# Patient Record
Sex: MALE | Race: WHITE | NOT HISPANIC OR LATINO | Employment: OTHER | URBAN - METROPOLITAN AREA
[De-identification: names, ages, dates, MRNs, and addresses within clinical notes are randomized per-mention and may not be internally consistent; named-entity substitution may affect disease eponyms.]

---

## 2019-10-28 ENCOUNTER — TRANSCRIBE ORDERS (OUTPATIENT)
Dept: ADMINISTRATIVE | Facility: HOSPITAL | Age: 64
End: 2019-10-28

## 2019-10-28 DIAGNOSIS — R94.39 ABNORMAL STRESS TEST: Primary | ICD-10-CM

## 2019-10-29 ENCOUNTER — HOSPITAL ENCOUNTER (OUTPATIENT)
Facility: HOSPITAL | Age: 64
Setting detail: HOSPITAL OUTPATIENT SURGERY
Discharge: HOME | End: 2019-10-29
Attending: INTERNAL MEDICINE | Admitting: INTERNAL MEDICINE

## 2019-10-29 VITALS
SYSTOLIC BLOOD PRESSURE: 154 MMHG | OXYGEN SATURATION: 96 % | RESPIRATION RATE: 16 BRPM | BODY MASS INDEX: 26.74 KG/M2 | TEMPERATURE: 98.2 F | HEIGHT: 71 IN | HEART RATE: 81 BPM | WEIGHT: 191 LBS | DIASTOLIC BLOOD PRESSURE: 83 MMHG

## 2019-10-29 DIAGNOSIS — R94.39 ABNORMAL STRESS TEST: ICD-10-CM

## 2019-10-29 LAB
ANION GAP SERPL CALCULATED.3IONS-SCNC: 9 MMOL/L (ref 5–15)
BUN BLD-MCNC: 13 MG/DL (ref 8–23)
BUN BLDA-MCNC: 13 MG/DL (ref 8–26)
BUN/CREAT SERPL: 17.3 (ref 7–25)
CA-I BLDA-SCNC: 1.27 MMOL/L (ref 1.2–1.32)
CALCIUM SPEC-SCNC: 9.5 MG/DL (ref 8.6–10.5)
CHLORIDE BLDA-SCNC: 98 MMOL/L (ref 98–109)
CHLORIDE SERPL-SCNC: 100 MMOL/L (ref 98–107)
CO2 BLDA-SCNC: 32 MMOL/L (ref 24–29)
CO2 SERPL-SCNC: 31 MMOL/L (ref 22–29)
CREAT BLD-MCNC: 0.75 MG/DL (ref 0.76–1.27)
CREAT BLDA-MCNC: 1 MG/DL (ref 0.6–1.3)
DEPRECATED RDW RBC AUTO: 38.3 FL (ref 37–54)
ERYTHROCYTE [DISTWIDTH] IN BLOOD BY AUTOMATED COUNT: 12.8 % (ref 12.3–15.4)
GFR SERPL CREATININE-BSD FRML MDRD: 105 ML/MIN/1.73
GLUCOSE BLD-MCNC: 103 MG/DL (ref 65–99)
GLUCOSE BLDC GLUCOMTR-MCNC: 100 MG/DL (ref 70–130)
HCT VFR BLD AUTO: 50.8 % (ref 37.5–51)
HCT VFR BLDA CALC: 51 % (ref 38–51)
HGB BLD-MCNC: 17 G/DL (ref 13–17.7)
HGB BLDA-MCNC: 17.3 G/DL (ref 12–17)
MCH RBC QN AUTO: 27.8 PG (ref 26.6–33)
MCHC RBC AUTO-ENTMCNC: 33.5 G/DL (ref 31.5–35.7)
MCV RBC AUTO: 83 FL (ref 79–97)
PLATELET # BLD AUTO: 144 10*3/MM3 (ref 140–450)
PMV BLD AUTO: 10.6 FL (ref 6–12)
POTASSIUM BLD-SCNC: 4.9 MMOL/L (ref 3.5–5.2)
POTASSIUM BLDA-SCNC: 4.6 MMOL/L (ref 3.5–4.9)
RBC # BLD AUTO: 6.12 10*6/MM3 (ref 4.14–5.8)
SODIUM BLD-SCNC: 140 MMOL/L (ref 136–145)
SODIUM BLDA-SCNC: 140 MMOL/L (ref 138–146)
WBC NRBC COR # BLD: 5.78 10*3/MM3 (ref 3.4–10.8)

## 2019-10-29 PROCEDURE — 25010000002 MIDAZOLAM PER 1 MG: Performed by: INTERNAL MEDICINE

## 2019-10-29 PROCEDURE — C1769 GUIDE WIRE: HCPCS | Performed by: INTERNAL MEDICINE

## 2019-10-29 PROCEDURE — 25010000002 FENTANYL CITRATE (PF) 100 MCG/2ML SOLUTION: Performed by: INTERNAL MEDICINE

## 2019-10-29 PROCEDURE — 36415 COLL VENOUS BLD VENIPUNCTURE: CPT

## 2019-10-29 PROCEDURE — 85027 COMPLETE CBC AUTOMATED: CPT | Performed by: INTERNAL MEDICINE

## 2019-10-29 PROCEDURE — 80047 BASIC METABLC PNL IONIZED CA: CPT

## 2019-10-29 PROCEDURE — 93458 L HRT ARTERY/VENTRICLE ANGIO: CPT | Performed by: INTERNAL MEDICINE

## 2019-10-29 PROCEDURE — 0 IOPAMIDOL PER 1 ML: Performed by: INTERNAL MEDICINE

## 2019-10-29 PROCEDURE — 25010000002 HEPARIN (PORCINE) PER 1000 UNITS: Performed by: INTERNAL MEDICINE

## 2019-10-29 PROCEDURE — 80048 BASIC METABOLIC PNL TOTAL CA: CPT | Performed by: INTERNAL MEDICINE

## 2019-10-29 PROCEDURE — C1894 INTRO/SHEATH, NON-LASER: HCPCS | Performed by: INTERNAL MEDICINE

## 2019-10-29 PROCEDURE — 85014 HEMATOCRIT: CPT

## 2019-10-29 RX ORDER — MORPHINE SULFATE 2 MG/ML
1 INJECTION, SOLUTION INTRAMUSCULAR; INTRAVENOUS EVERY 4 HOURS PRN
Status: DISCONTINUED | OUTPATIENT
Start: 2019-10-29 | End: 2019-10-29 | Stop reason: HOSPADM

## 2019-10-29 RX ORDER — CLOPIDOGREL BISULFATE 75 MG/1
75 TABLET ORAL DAILY
COMMUNITY

## 2019-10-29 RX ORDER — SODIUM CHLORIDE 9 MG/ML
250 INJECTION, SOLUTION INTRAVENOUS CONTINUOUS
Status: ACTIVE | OUTPATIENT
Start: 2019-10-29 | End: 2019-10-29

## 2019-10-29 RX ORDER — LIDOCAINE HYDROCHLORIDE 10 MG/ML
INJECTION, SOLUTION EPIDURAL; INFILTRATION; INTRACAUDAL; PERINEURAL AS NEEDED
Status: DISCONTINUED | OUTPATIENT
Start: 2019-10-29 | End: 2019-10-29 | Stop reason: HOSPADM

## 2019-10-29 RX ORDER — FENTANYL CITRATE 50 UG/ML
INJECTION, SOLUTION INTRAMUSCULAR; INTRAVENOUS AS NEEDED
Status: DISCONTINUED | OUTPATIENT
Start: 2019-10-29 | End: 2019-10-29 | Stop reason: HOSPADM

## 2019-10-29 RX ORDER — TRAMADOL HYDROCHLORIDE 50 MG/1
50 TABLET ORAL EVERY 6 HOURS PRN
COMMUNITY

## 2019-10-29 RX ORDER — ACETAMINOPHEN 325 MG/1
650 TABLET ORAL EVERY 4 HOURS PRN
Status: DISCONTINUED | OUTPATIENT
Start: 2019-10-29 | End: 2019-10-29 | Stop reason: HOSPADM

## 2019-10-29 RX ORDER — HYDROCODONE BITARTRATE AND ACETAMINOPHEN 5; 325 MG/1; MG/1
1 TABLET ORAL EVERY 4 HOURS PRN
Status: DISCONTINUED | OUTPATIENT
Start: 2019-10-29 | End: 2019-10-29 | Stop reason: HOSPADM

## 2019-10-29 RX ORDER — MIDAZOLAM HYDROCHLORIDE 1 MG/ML
INJECTION INTRAMUSCULAR; INTRAVENOUS AS NEEDED
Status: DISCONTINUED | OUTPATIENT
Start: 2019-10-29 | End: 2019-10-29 | Stop reason: HOSPADM

## 2019-10-29 RX ORDER — ASPIRIN 325 MG
325 TABLET, DELAYED RELEASE (ENTERIC COATED) ORAL DAILY
Status: DISCONTINUED | OUTPATIENT
Start: 2019-10-29 | End: 2019-10-29 | Stop reason: HOSPADM

## 2019-10-29 RX ORDER — LISINOPRIL AND HYDROCHLOROTHIAZIDE 20; 12.5 MG/1; MG/1
1 TABLET ORAL DAILY
COMMUNITY

## 2019-10-29 RX ORDER — ALPRAZOLAM 0.25 MG/1
0.25 TABLET ORAL 3 TIMES DAILY PRN
Status: DISCONTINUED | OUTPATIENT
Start: 2019-10-29 | End: 2019-10-29 | Stop reason: HOSPADM

## 2019-10-29 RX ORDER — TEMAZEPAM 7.5 MG/1
7.5 CAPSULE ORAL NIGHTLY PRN
Status: DISCONTINUED | OUTPATIENT
Start: 2019-10-29 | End: 2019-10-29 | Stop reason: HOSPADM

## 2019-10-29 RX ORDER — NALOXONE HCL 0.4 MG/ML
0.4 VIAL (ML) INJECTION
Status: DISCONTINUED | OUTPATIENT
Start: 2019-10-29 | End: 2019-10-29 | Stop reason: HOSPADM

## 2019-10-29 RX ORDER — ASPIRIN 81 MG/1
81 TABLET, CHEWABLE ORAL DAILY
COMMUNITY

## 2019-10-29 RX ADMIN — ASPIRIN 325 MG: 325 TABLET, COATED ORAL at 11:39

## 2022-10-20 ENCOUNTER — TELEPHONE (OUTPATIENT)
Dept: GASTROENTEROLOGY | Facility: CLINIC | Age: 67
End: 2022-10-20

## 2022-10-20 DIAGNOSIS — Z12.11 ENCOUNTER FOR SCREENING COLONOSCOPY: Primary | ICD-10-CM

## 2022-10-26 ENCOUNTER — OUTSIDE FACILITY SERVICE (OUTPATIENT)
Dept: GASTROENTEROLOGY | Facility: CLINIC | Age: 67
End: 2022-10-26

## 2022-10-26 PROCEDURE — 88305 TISSUE EXAM BY PATHOLOGIST: CPT | Performed by: INTERNAL MEDICINE

## 2022-10-26 PROCEDURE — 45380 COLONOSCOPY AND BIOPSY: CPT | Performed by: INTERNAL MEDICINE

## 2022-10-26 PROCEDURE — 45385 COLONOSCOPY W/LESION REMOVAL: CPT | Performed by: INTERNAL MEDICINE

## 2022-10-27 ENCOUNTER — LAB REQUISITION (OUTPATIENT)
Dept: LAB | Facility: HOSPITAL | Age: 67
End: 2022-10-27

## 2022-10-27 DIAGNOSIS — K64.8 OTHER HEMORRHOIDS: ICD-10-CM

## 2022-10-27 DIAGNOSIS — D12.8 BENIGN NEOPLASM OF RECTUM: ICD-10-CM

## 2022-10-27 DIAGNOSIS — Z12.11 ENCOUNTER FOR SCREENING FOR MALIGNANT NEOPLASM OF COLON: ICD-10-CM

## 2022-10-27 DIAGNOSIS — Z80.0 FAMILY HISTORY OF MALIGNANT NEOPLASM OF DIGESTIVE ORGANS: ICD-10-CM

## 2022-10-27 DIAGNOSIS — K63.89 OTHER SPECIFIED DISEASES OF INTESTINE: ICD-10-CM

## 2022-10-28 LAB
CYTO UR: NORMAL
LAB AP CASE REPORT: NORMAL
LAB AP CLINICAL INFORMATION: NORMAL
PATH REPORT.FINAL DX SPEC: NORMAL
PATH REPORT.GROSS SPEC: NORMAL

## 2024-03-15 NOTE — PROGRESS NOTES
New Cardiology Patient Office Visit      Date: 2024  Patient Name: Kunal Pierre  : 1955   MRN: [id number]   PCP: Edwin Barr MD   Referring Provider: Edwin Barr MD     Chief Complaint:    Chief Complaint   Patient presents with    Hypertensive Arteriosclerotic Cardiovascular Disease        History of Present Illness: Kunal Pierre is a 68 y.o. male who is here today for evaluation of multiple medical problems.  His main concern is his fatigue which has been going on for some.  Of time.  His blood pressure has been running high also.  Patient was adjusted with his medication and blood pressure has come down to good.  Patient has been actively working with sleep apnea doctor and finally got a mask that will work.  He started feeling a little better at this time.    Patient denies any significant symptoms but he is unable to figure it out about his fatigue factor.  He has been worked up extensively and could not so far came up with any significant plan.  He denies any weight loss or any weight gain.    His cholesterol has been running high and he has tried few medications in the past but he has severe problem with all the statins in terms of his joint and muscle pains.      Problem List   CARDIAC  Coronary Artery Disease:    MI with stent to mid LAD  10/2019 [facility] patent stent, continue medical therapy.    Myocardium:   2019 Echo EF 55% G1DD   LVH    Valvular:   No known valvular disease     Electrical:   NSR     Pericardium:   Normal     CARDIAC RISK FACTORS  Hypertension  Diabetes  2024 A1C 5.8  Dyslipidemia  2024   HDL 45   Obstructive Sleep Apnea    NON-CARDIAC  Lyme disease  Chronic back pain  Polycythemia    SURGERIES  None      Subjective      Review of Systems:   Review of Systems   Respiratory: Negative.     Cardiovascular:  Positive for palpitations.       Medications:   Current Outpatient Medications   Medication Sig  "Dispense Refill    buPROPion XL (WELLBUTRIN XL) 150 MG 24 hr tablet Take 1 tablet by mouth Every Morning.      clopidogrel (PLAVIX) 75 MG tablet Take 1 tablet by mouth Daily.      dilTIAZem (TIAZAC) 180 MG 24 hr capsule Take 1 capsule by mouth Daily.      glipizide (GLUCOTROL XL) 10 MG 24 hr tablet Take 1 tablet by mouth Daily.      lisinopril (PRINIVIL,ZESTRIL) 40 MG tablet Take 1 tablet by mouth Daily.      lisinopril-hydrochlorothiazide (PRINZIDE,ZESTORETIC) 20-12.5 MG per tablet Take 1 tablet by mouth Daily. (Patient not taking: Reported on 3/19/2024)       No current facility-administered medications for this visit.           The following portions of the patient's history were reviewed and updated as appropriate: allergies, current medications, past family history, past medical history, past social history, past surgical history and problem list.    Objective     Physical Exam:  Vital Signs:   Vitals:    03/19/24 0941   BP: 140/78   BP Location: Right arm   Patient Position: Sitting   Cuff Size: Adult   Pulse: 98   SpO2: 95%   Weight: 87.5 kg (193 lb)   Height: 177.8 cm (70\")     Body mass index is 27.69 kg/m².     Constitutional:       General: Not in acute distress.     Appearance: Healthy appearance. Not in distress.     Neck:     JVP: Not elevated     Carotid artery: No carotid bruit    Pulmonary:      Effort: Pulmonary effort is normal.      Breath sounds: Normal breath sounds. No wheezing. No rhonchi. No rales.     Cardiovascular:      Normal rate. Regular rhythm. Normal S1. Normal S2.      Murmurs: There is no significant murmur.      No gallop. No click. No rub.     Abdominal:      General: Bowel sounds are normal.      Palpations: Abdomen is soft.      Tenderness: There is no abdominal tenderness.    Extremities:     Pulses: Good pulses     Edema: No edema    Labs:  Lab Results   Component Value Date    GLUCOSE 103 (H) 10/29/2019    BUN 13 10/29/2019    CREATININE 1.00 10/29/2019    EGFRIFNONA 105 " 10/29/2019    BCR 17.3 10/29/2019    K 4.9 10/29/2019    CO2 31.0 (H) 10/29/2019    CALCIUM 9.5 10/29/2019     Lab Results   Component Value Date    WBC 5.78 10/29/2019    HGB 17.3 (H) 10/29/2019    HCT 51 10/29/2019    MCV 83.0 10/29/2019     10/29/2019         ECG 12 Lead    Date/Time: 3/19/2024 10:41 AM  Performed by: Christa Landry MD    Authorized by: Christa Landry MD  Comparison: compared with previous ECG from 8/21/2019  Similar to previous ECG  Rhythm: sinus rhythm  Q waves: V1, V2 and aVL            Smoking Cessation:   Tobacco Product History : Patient quit smoking long time ago     Advance Care Planning   ACP discussion was held with the patient during this visit. Patient has an advance directive in EMR which is still valid.             Assessment / Plan      Assessment:   Diagnosis Plan   1. Coronary artery disease involving native coronary artery of native heart without angina pectoris        2. Mixed hyperlipidemia        3. Essential hypertension             Plan:  Patient did not have any evaluation for his coronary artery disease for long period of time.  We will go ahead and schedule him for stress test to rule out any cause for his fatigue.  His cholesterol has been running very high and at this time he wants to wait and see if all the other treatment he is getting settled down.  I have given him options of PCSK9 and he wants to proceed with them once all his other activities have been established.  His blood pressure has been under good control.  I have advised him to start taking spironolactone which she wants to wait.  I believe that will help his blood pressure more with his sleep apnea.  Also advised him to increase his diltiazem if his heart rate keep on going fast.            Follow Up:   Return in about 1 year (around 3/19/2025).    Christa Landry MD

## 2024-03-19 ENCOUNTER — OFFICE VISIT (OUTPATIENT)
Dept: CARDIOLOGY | Facility: CLINIC | Age: 69
End: 2024-03-19
Payer: MEDICARE

## 2024-03-19 VITALS
WEIGHT: 193 LBS | HEIGHT: 70 IN | BODY MASS INDEX: 27.63 KG/M2 | OXYGEN SATURATION: 95 % | HEART RATE: 98 BPM | DIASTOLIC BLOOD PRESSURE: 78 MMHG | SYSTOLIC BLOOD PRESSURE: 140 MMHG

## 2024-03-19 DIAGNOSIS — I10 ESSENTIAL HYPERTENSION: ICD-10-CM

## 2024-03-19 DIAGNOSIS — E78.2 MIXED HYPERLIPIDEMIA: ICD-10-CM

## 2024-03-19 DIAGNOSIS — I25.10 CORONARY ARTERY DISEASE INVOLVING NATIVE CORONARY ARTERY OF NATIVE HEART WITHOUT ANGINA PECTORIS: Primary | ICD-10-CM

## 2024-03-19 PROCEDURE — 1160F RVW MEDS BY RX/DR IN RCRD: CPT | Performed by: INTERNAL MEDICINE

## 2024-03-19 PROCEDURE — 1159F MED LIST DOCD IN RCRD: CPT | Performed by: INTERNAL MEDICINE

## 2024-03-19 PROCEDURE — 99204 OFFICE O/P NEW MOD 45 MIN: CPT | Performed by: INTERNAL MEDICINE

## 2024-03-19 PROCEDURE — 93000 ELECTROCARDIOGRAM COMPLETE: CPT | Performed by: INTERNAL MEDICINE

## 2024-03-19 RX ORDER — DILTIAZEM HYDROCHLORIDE 180 MG/1
180 CAPSULE, EXTENDED RELEASE ORAL DAILY
COMMUNITY
Start: 2024-03-04

## 2024-03-19 RX ORDER — LISINOPRIL 40 MG/1
1 TABLET ORAL DAILY
COMMUNITY

## 2024-03-19 RX ORDER — GLIPIZIDE 10 MG/1
10 TABLET, FILM COATED, EXTENDED RELEASE ORAL DAILY
COMMUNITY
Start: 2024-01-12

## 2024-03-19 RX ORDER — BUPROPION HYDROCHLORIDE 150 MG/1
150 TABLET ORAL EVERY MORNING
COMMUNITY
Start: 2024-03-12

## 2024-11-05 ENCOUNTER — TRANSCRIBE ORDERS (OUTPATIENT)
Dept: LAB | Facility: HOSPITAL | Age: 69
End: 2024-11-05
Payer: MEDICARE

## 2024-11-05 ENCOUNTER — LAB (OUTPATIENT)
Dept: LAB | Facility: HOSPITAL | Age: 69
End: 2024-11-05
Payer: MEDICARE

## 2024-11-05 DIAGNOSIS — I81 PORTAL VEIN THROMBOSIS: ICD-10-CM

## 2024-11-05 DIAGNOSIS — D68.9 BLOOD CLOTTING DISORDER: ICD-10-CM

## 2024-11-05 DIAGNOSIS — D68.9 BLOOD CLOTTING DISORDER: Primary | ICD-10-CM

## 2024-11-05 DIAGNOSIS — A69.20 LYME DISEASE: ICD-10-CM

## 2024-11-05 PROCEDURE — 81241 F5 GENE: CPT

## 2024-11-05 PROCEDURE — 36415 COLL VENOUS BLD VENIPUNCTURE: CPT

## 2024-11-05 PROCEDURE — 81240 F2 GENE: CPT

## 2024-11-05 PROCEDURE — 86022 PLATELET ANTIBODIES: CPT

## 2024-11-06 LAB
F5 GENE MUT ANL BLD/T: ABNORMAL
FACTOR II, DNA ANALYSIS: NORMAL

## 2024-11-07 LAB
Lab: NORMAL
PF4 HEPARIN CMPLX IGG SERPL IA: 0.04 OD (ref 0–0.4)

## 2024-11-10 LAB
CITATION REF LAB TEST: NORMAL
JAK2 P.V617F BLD/T QL: NORMAL
LAB DIRECTOR NAME PROVIDER: NORMAL
LABORATORY COMMENT REPORT: NORMAL
REF LAB TEST METHOD: NORMAL

## 2025-01-24 ENCOUNTER — LAB (OUTPATIENT)
Dept: LAB | Facility: HOSPITAL | Age: 70
End: 2025-01-24
Payer: MEDICARE

## 2025-01-24 ENCOUNTER — CONSULT (OUTPATIENT)
Dept: ONCOLOGY | Facility: CLINIC | Age: 70
End: 2025-01-24
Payer: MEDICARE

## 2025-01-24 VITALS
BODY MASS INDEX: 27.63 KG/M2 | WEIGHT: 193 LBS | TEMPERATURE: 97.3 F | HEIGHT: 70 IN | HEART RATE: 80 BPM | DIASTOLIC BLOOD PRESSURE: 93 MMHG | OXYGEN SATURATION: 96 % | SYSTOLIC BLOOD PRESSURE: 165 MMHG

## 2025-01-24 DIAGNOSIS — R39.15 URGENCY OF URINATION: ICD-10-CM

## 2025-01-24 DIAGNOSIS — I81 PORTAL VEIN THROMBOSIS: ICD-10-CM

## 2025-01-24 DIAGNOSIS — I81 PORTAL VEIN THROMBOSIS: Primary | ICD-10-CM

## 2025-01-24 PROCEDURE — 85705 THROMBOPLASTIN INHIBITION: CPT

## 2025-01-24 PROCEDURE — 85810 BLOOD VISCOSITY EXAMINATION: CPT

## 2025-01-24 PROCEDURE — 85613 RUSSELL VIPER VENOM DILUTED: CPT

## 2025-01-24 PROCEDURE — 84153 ASSAY OF PSA TOTAL: CPT

## 2025-01-24 PROCEDURE — 85732 THROMBOPLASTIN TIME PARTIAL: CPT

## 2025-01-24 PROCEDURE — 85306 CLOT INHIBIT PROT S FREE: CPT

## 2025-01-24 PROCEDURE — 85670 THROMBIN TIME PLASMA: CPT

## 2025-01-24 PROCEDURE — 82784 ASSAY IGA/IGD/IGG/IGM EACH: CPT

## 2025-01-24 PROCEDURE — 85305 CLOT INHIBIT PROT S TOTAL: CPT

## 2025-01-24 PROCEDURE — 85303 CLOT INHIBIT PROT C ACTIVITY: CPT

## 2025-01-24 PROCEDURE — 86334 IMMUNOFIX E-PHORESIS SERUM: CPT

## 2025-01-24 PROCEDURE — 36415 COLL VENOUS BLD VENIPUNCTURE: CPT

## 2025-01-24 PROCEDURE — 86146 BETA-2 GLYCOPROTEIN ANTIBODY: CPT

## 2025-01-24 PROCEDURE — 85302 CLOT INHIBIT PROT C ANTIGEN: CPT

## 2025-01-24 PROCEDURE — 84165 PROTEIN E-PHORESIS SERUM: CPT

## 2025-01-24 PROCEDURE — 86147 CARDIOLIPIN ANTIBODY EA IG: CPT

## 2025-01-24 PROCEDURE — 85300 ANTITHROMBIN III ACTIVITY: CPT

## 2025-01-24 PROCEDURE — 84155 ASSAY OF PROTEIN SERUM: CPT

## 2025-01-24 RX ORDER — METOPROLOL SUCCINATE 50 MG/1
50 TABLET, EXTENDED RELEASE ORAL
COMMUNITY

## 2025-01-24 RX ORDER — RIVAROXABAN 20 MG/1
TABLET, FILM COATED ORAL
COMMUNITY
Start: 2024-08-01

## 2025-01-24 RX ORDER — EMPAGLIFLOZIN 10 MG/1
TABLET, FILM COATED ORAL
COMMUNITY
Start: 2025-01-14

## 2025-01-24 NOTE — PROGRESS NOTES
CHIEF COMPLAINT: Chronic abdominal swelling and discomforts    REASON FOR REFERRAL: Thrombosis      RECORDS OBTAINED  Records of the patients history including those obtained from [facility] and  were reviewed and summarized in detail.    Oncology/Hematology History Overview Note   1.  Portal vein occlusion seen at   2.  DVT lower extremity  3.  Coronary artery disease  4.  COVID and flu  5.  History of Lyme disease  6.  History of colon polyps    -1724 gastroenterology consultation Dr. Yovanny MA with past history of coronary disease stenting 2010 with hypertension hyperlipidemia diabetes with recent COVID and flu with portal vein thrombosis given thrombolytic and April 2024 with DVT left lower extremity.  Prescription for anticoagulation given but apparently never made it to the pharmacy and apparently was not taken.  8/1/2024 CT outside hospital concerning for portal vein thrombosis.  Doppler of lower extremity showed no DVTs.  CT venogram showed completely occluded main portal vein as well as right and left main portal veins and partially occluded splenic vein, superior mesenteric and inferior mesenteric veins with mesenteric stranding in the mesenteric root with increased pelvic ascites and no evidence of inferior vena cava compression.  9 mm right lower lobe nodule.  Reportedly had seen hematology and was told he had no malignancies though they do not have those records.  Both parents had colon cancer in their 50s and 60s and gets colonoscopies every 3 years last 1 in October 2022 with a centimeter polyp.  History of Lyme disease as well.  Recommended lifelong anticoagulation and follow-up with hematology in October for hypercoagulable workup.  -10/29/2024 gastroenterology consult Dr. Jennifer Xiong [facility].  Note indicates patient was found to have portal vein thrombosis [facility] August 2024 started on Xarelto and Plavix and feeling since that time.  Had COVID infection July  2024 then flew August 2024.  No known trauma, liver disease, or coagulation disorder.  Normal liver morphology on outside imaging.  Recommended LV US for surveillance of portal vein thrombosis and liver morphology and continue Xarelto and Plavix with consultation of vascular medicine to evaluate for potential underlying coagulation disorder  -10/31/2024 vascular consult Dr. Chuck Lema at [facility] indicates patient had completely occluded main portal vein as well as right and left main portal veins August 2024.  Had partially occluded splenic vein, superior mesenteric vein, inferior mesenteric vein.  Recommend continued follow-up of these findings with CT venography of the abdomen and pelvis.  Coag workup recommended including factor V Leiden prothrombin gene mutation PNH panel and platelet factor 4 for COVID related thromboses.  Other hypercoagulable workup deferred due to current coagulation which may affect results.  -11/5/2024 JAK2 negative.  Factor V Leiden heterozygous gene mutation.  Prothrombin gene mutation negative.  Heparin-induced platelet antibody -0.38.  Peripheral blood flow cytometry unremarkable.     Portal vein thrombosis   1/24/2025 Initial Diagnosis    Portal vein thrombosis         HISTORY OF PRESENT ILLNESS:  The patient is a 69 y.o.  male, referred for portal vein thrombosis complicated history as outlined above and below    REVIEW OF SYSTEMS:  Presently feeling okay but still with some abdominal fullness at times    Past Medical History:   Diagnosis Date    Hypertension      Past Surgical History:   Procedure Laterality Date    CARDIAC CATHETERIZATION      CARDIAC CATHETERIZATION N/A 10/29/2019    Procedure: Left Heart Cath;  Surgeon: Kavon Fung MD;  Location: [facility] CATH INVASIVE LOCATION;  Service: Cardiology    CORONARY ANGIOPLASTY WITH STENT PLACEMENT         Current Outpatient Medications on File Prior to Visit   Medication Sig Dispense Refill    Jardiance 10 MG  "tablet tablet       Xarelto 20 MG tablet       buPROPion XL (WELLBUTRIN XL) 150 MG 24 hr tablet Take 1 tablet by mouth Every Morning.      clopidogrel (PLAVIX) 75 MG tablet Take 1 tablet by mouth Daily.      glipizide (GLUCOTROL XL) 10 MG 24 hr tablet Take 1 tablet by mouth Daily.      lisinopril (PRINIVIL,ZESTRIL) 40 MG tablet Take 1 tablet by mouth Daily.      metoprolol succinate XL (TOPROL-XL) 50 MG 24 hr tablet Take 1 tablet by mouth.      [DISCONTINUED] dilTIAZem (TIAZAC) 180 MG 24 hr capsule Take 1 capsule by mouth Daily.       No current facility-administered medications on file prior to visit.       Allergies   Allergen Reactions    Statins Myalgia       Social History     Socioeconomic History    Marital status:    Tobacco Use    Smoking status: Former     Types: Cigarettes, Cigars     Passive exposure: Past    Smokeless tobacco: Never    Tobacco comments:     quit 30 yrs ago    Vaping Use    Vaping status: Some Days    Substances: Nicotine, Flavoring    Devices: Pre-filled pod   Substance and Sexual Activity    Alcohol use: Yes     Alcohol/week: 2.0 - 3.0 standard drinks of alcohol     Types: 2 - 3 Glasses of wine per week     Comment: wine 2-3 glasses per night    Drug use: Never    Sexual activity: Yes       Family History   Problem Relation Age of Onset    Colon cancer Mother     Angina Father     Colon cancer Father        PHYSICAL EXAM:  No shifting dullness.  No caput medusa.  No respiratory distress    /93   Pulse 80   Temp 97.3 °F (36.3 °C) (Infrared)   Ht 177.8 cm (70\")   Wt 87.5 kg (193 lb)   SpO2 96%   BMI 27.69 kg/m²     ECOG score: 0           ECOG: (0) Fully Active - Able to Carry On All Pre-disease Performance Without Restriction    Lab Results   Component Value Date    HGB 13.6 (L) 08/05/2024    HCT 39.7 (L) 08/05/2024    MCV 79 08/05/2024     (L) 08/05/2024    WBC 4.78 08/05/2024    NEUTROABS 4.38 08/02/2024    LYMPHSABS 1.36 08/02/2024    MONOSABS 0.59 " 08/02/2024    EOSABS 0.09 08/02/2024    BASOSABS 0.03 08/02/2024     Lab Results   Component Value Date    GLUCOSE 103 (H) 10/29/2019    BUN 13 10/29/2019    CREATININE 1.00 10/29/2019     10/29/2019    K 4.9 10/29/2019     10/29/2019    CO2 31.0 (H) 10/29/2019    CALCIUM 9.5 10/29/2019         Assessment & Plan   1.  Portal vein occlusion seen at .  History as below  2.  Putative DVT lower extremity April 2024 per patient that was briefly heparinized and then the next day was told this was not the case and anticoagulation was not given further.  This was negative.  3.  Coronary artery disease  4.  COVID and flu  5.  History of Lyme disease  6.  History of colon polyps    - 10/17/2024 gastroenterology consultation Dr. Hairston  with past history of coronary disease stenting 2010 with hypertension hyperlipidemia diabetes with recent COVID and flu with portal vein thrombosis given thrombolytic and April 2024 with DVT left lower extremity.  Prescription for anticoagulation given but apparently never made it to the pharmacy and apparently was not taken.  8/1/2024 CT outside hospital concerning for portal vein thrombosis.  Doppler of lower extremity showed no DVTs.  CT venogram showed completely occluded main portal vein as well as right and left main portal veins and partially occluded splenic vein, superior mesenteric and inferior mesenteric veins with mesenteric stranding in the mesenteric root with increased pelvic ascites and no evidence of inferior vena cava compression.  9 mm right lower lobe nodule.  Reportedly had seen hematology and was told he had no malignancies though they do not have those records.  Both parents had colon cancer in their 50s and 60s and gets colonoscopies every 3 years last 1 in October 2022 with a centimeter polyp.  History of Lyme disease as well.  Recommended lifelong anticoagulation and follow-up with hematology in October for hypercoagulable workup.  -10/29/2024  gastroenterology consult Dr. Jennifer Xiong [facility].  Note indicates patient was found to have portal vein thrombosis [facility] August 2024 started on Xarelto and Plavix and feeling since that time.  Had COVID infection July 2024 then flew August 2024.  No known trauma, liver disease, or coagulation disorder.  Normal liver morphology on outside imaging.  Recommended LV US for surveillance of portal vein thrombosis and liver morphology and continue Xarelto and Plavix with consultation of vascular medicine to evaluate for potential underlying coagulation disorder  -10/31/2024 vascular consult Dr. Chuck Lema at [facility] indicates patient had completely occluded main portal vein as well as right and left main portal veins August 2024.  Had partially occluded splenic vein, superior mesenteric vein, inferior mesenteric vein.  Recommend continued follow-up of these findings with CT venography of the abdomen and pelvis.  Coag workup recommended including factor V Leiden prothrombin gene mutation PNH panel and platelet factor 4 for COVID related thromboses.  Other hypercoagulable workup deferred due to current coagulation which may affect results.  -11/5/2024 JAK2 negative.  Factor V Leiden heterozygous gene mutation.  Prothrombin gene mutation negative.  Heparin-induced platelet antibody -0.38.  Peripheral blood flow cytometry unremarkable.    -1/24/2025 [facility] consult: I reviewed the history in detail with the patient above.  He seems to have had cryptogenic rather massive portal vein thrombosis with persistence on ultrasound in October.  He had not had not had COVID multiple times and has had abdominal pains for several years and wonders if this could have been going on for quite some time.  From a hypercoagulable standpoint he is heterozygous factor V Leiden mutated which is a weak procoagulant by itself and I suspect unlikely by itself to have caused all this but even if it were he  would stay on lifetime DOAC.  He is currently takingXarelto and Plavix.  I would stick with that.  I will complete his hypercoagulable workup by checking PNH panel, SIFE and serum viscosity, and repeat the CT of his chest for comparison to make sure the small nodule in the right lower lobe is stable as he has already been scoped and had other malignancies vetted.  I will check his PSA.Will check his antiphospholipid antibody panel as well and it would be treated with Coumadin preferentially if found to be present and can cause significant and severe thromboses.  For completeness sake I will check his protein C and S panel jumped up as well as Antithrombin III but the odds of these being culprit are statistically quite low.  Regardless of what we find he of course needs lifetime anticoagulation but it is possible that with his portal vein with mesenteric insufficiency that he is getting wobbly edema of the bowel and DOAC absorption can fail in that setting.  He needs serial ultrasonography with vascular surgery and he wants to follow at  and I recommended he get back with them and if there is progressive clot then I would probably switch him to Coumadin unless we find malignancy.  I will see him back in about a month to go over all of this.      Total time of care today inclusive of time spent today prior to patient's arrival reviewing past data and during visit translating to patient the very complex decision tree as outlined above and after visit instituting this plan took 80 minutes patient care time throughout the day today.      Ciro Stone MD    1/24/2025

## 2025-01-24 NOTE — LETTER
January 24, 2025     Edwin Barr MD  [address] [place] [address]    Patient: Kunal Pierre   YOB: 1955   Date of Visit: 1/24/2025     Dear Edwin Barr MD:       Thank you for referring Kunal Pierre to me for evaluation. Below are the relevant portions of my assessment and plan of care.    If you have questions, please do not hesitate to call me. I look forward to following Kunal along with you.         Sincerely,        Ciro Stone MD        CC: No Recipients    Ciro Stone MD  01/24/25 1618  Sign when Signing Visit  CHIEF COMPLAINT: Chronic abdominal swelling and discomforts    REASON FOR REFERRAL: Thrombosis      RECORDS OBTAINED  Records of the patients history including those obtained from [facility] and  were reviewed and summarized in detail.    Oncology/Hematology History Overview Note   1.  Portal vein occlusion seen at   2.  DVT lower extremity  3.  Coronary artery disease  4.  COVID and flu  5.  History of Lyme disease  6.  History of colon polyps    -1724 gastroenterology consultation Dr. Yovanny MA with past history of coronary disease stenting 2010 with hypertension hyperlipidemia diabetes with recent COVID and flu with portal vein thrombosis given thrombolytic and April 2024 with DVT left lower extremity.  Prescription for anticoagulation given but apparently never made it to the pharmacy and apparently was not taken.  8/1/2024 CT outside hospital concerning for portal vein thrombosis.  Doppler of lower extremity showed no DVTs.  CT venogram showed completely occluded main portal vein as well as right and left main portal veins and partially occluded splenic vein, superior mesenteric and inferior mesenteric veins with mesenteric stranding in the mesenteric root with increased pelvic ascites and no evidence of inferior vena cava compression.  9 mm right lower lobe nodule.  Reportedly had seen hematology and was told he had no  malignancies though they do not have those records.  Both parents had colon cancer in their 50s and 60s and gets colonoscopies every 3 years last 1 in October 2022 with a centimeter polyp.  History of Lyme disease as well.  Recommended lifelong anticoagulation and follow-up with hematology in October for hypercoagulable workup.  -10/29/2024 gastroenterology consult Dr. Jennifer Xiong [facility].  Note indicates patient was found to have portal vein thrombosis [facility] August 2024 started on Xarelto and Plavix and feeling since that time.  Had COVID infection July 2024 then flew August 2024.  No known trauma, liver disease, or coagulation disorder.  Normal liver morphology on outside imaging.  Recommended  US for surveillance of portal vein thrombosis and liver morphology and continue Xarelto and Plavix with consultation of vascular medicine to evaluate for potential underlying coagulation disorder  -10/31/2024 vascular consult Dr. Chuck Lema at [facility] indicates patient had completely occluded main portal vein as well as right and left main portal veins August 2024.  Had partially occluded splenic vein, superior mesenteric vein, inferior mesenteric vein.  Recommend continued follow-up of these findings with CT venography of the abdomen and pelvis.  Coag workup recommended including factor V Leiden prothrombin gene mutation [facility] panel and platelet factor 4 for COVID related thromboses.  Other hypercoagulable workup deferred due to current coagulation which may affect results.  -11/5/2024 JAK2 negative.  Factor V Leiden heterozygous gene mutation.  Prothrombin gene mutation negative.  Heparin-induced platelet antibody -0.38.  Peripheral blood flow cytometry unremarkable.     Portal vein thrombosis   1/24/2025 Initial Diagnosis    Portal vein thrombosis         HISTORY OF PRESENT ILLNESS:  The patient is a 69 y.o.  male, referred for portal vein thrombosis complicated history as outlined  above and below    REVIEW OF SYSTEMS:  Presently feeling okay but still with some abdominal fullness at times    Past Medical History:   Diagnosis Date   • Hypertension      Past Surgical History:   Procedure Laterality Date   • CARDIAC CATHETERIZATION     • CARDIAC CATHETERIZATION N/A 10/29/2019    Procedure: Left Heart Cath;  Surgeon: Kavon Fung MD;  Location: [facility] CATH INVASIVE LOCATION;  Service: Cardiology   • CORONARY ANGIOPLASTY WITH STENT PLACEMENT         Current Outpatient Medications on File Prior to Visit   Medication Sig Dispense Refill   • Jardiance 10 MG tablet tablet      • Xarelto 20 MG tablet      • buPROPion XL (WELLBUTRIN XL) 150 MG 24 hr tablet Take 1 tablet by mouth Every Morning.     • clopidogrel (PLAVIX) 75 MG tablet Take 1 tablet by mouth Daily.     • glipizide (GLUCOTROL XL) 10 MG 24 hr tablet Take 1 tablet by mouth Daily.     • lisinopril (PRINIVIL,ZESTRIL) 40 MG tablet Take 1 tablet by mouth Daily.     • metoprolol succinate XL (TOPROL-XL) 50 MG 24 hr tablet Take 1 tablet by mouth.     • [DISCONTINUED] dilTIAZem (TIAZAC) 180 MG 24 hr capsule Take 1 capsule by mouth Daily.       No current facility-administered medications on file prior to visit.       Allergies   Allergen Reactions   • Statins Myalgia       Social History     Socioeconomic History   • Marital status:    Tobacco Use   • Smoking status: Former     Types: Cigarettes, Cigars     Passive exposure: Past   • Smokeless tobacco: Never   • Tobacco comments:     quit 30 yrs ago    Vaping Use   • Vaping status: Some Days   • Substances: Nicotine, Flavoring   • Devices: Pre-filled pod   Substance and Sexual Activity   • Alcohol use: Yes     Alcohol/week: 2.0 - 3.0 standard drinks of alcohol     Types: 2 - 3 Glasses of wine per week     Comment: wine 2-3 glasses per night   • Drug use: Never   • Sexual activity: Yes       Family History   Problem Relation Age of Onset   • Colon cancer Mother    • Angina Father    •  "Colon cancer Father        PHYSICAL EXAM:  No shifting dullness.  No caput medusa.  No respiratory distress    /93   Pulse 80   Temp 97.3 °F (36.3 °C) (Infrared)   Ht 177.8 cm (70\")   Wt 87.5 kg (193 lb)   SpO2 96%   BMI 27.69 kg/m²     ECOG score: 0           ECOG: (0) Fully Active - Able to Carry On All Pre-disease Performance Without Restriction    Lab Results   Component Value Date    HGB 13.6 (L) 08/05/2024    HCT 39.7 (L) 08/05/2024    MCV 79 08/05/2024     (L) 08/05/2024    WBC 4.78 08/05/2024    NEUTROABS 4.38 08/02/2024    LYMPHSABS 1.36 08/02/2024    MONOSABS 0.59 08/02/2024    EOSABS 0.09 08/02/2024    BASOSABS 0.03 08/02/2024     Lab Results   Component Value Date    GLUCOSE 103 (H) 10/29/2019    BUN 13 10/29/2019    CREATININE 1.00 10/29/2019     10/29/2019    K 4.9 10/29/2019     10/29/2019    CO2 31.0 (H) 10/29/2019    CALCIUM 9.5 10/29/2019         Assessment & Plan  1.  Portal vein occlusion seen at .  History as below  2.  Putative DVT lower extremity April 2024 per patient that was briefly heparinized and then the next day was told this was not the case and anticoagulation was not given further.  This was negative.  3.  Coronary artery disease  4.  COVID and flu  5.  History of Lyme disease  6.  History of colon polyps    - 10/17/2024 gastroenterology consultation Dr. Hairston  with past history of coronary disease stenting 2010 with hypertension hyperlipidemia diabetes with recent COVID and flu with portal vein thrombosis given thrombolytic and April 2024 with DVT left lower extremity.  Prescription for anticoagulation given but apparently never made it to the pharmacy and apparently was not taken.  8/1/2024 CT outside hospital concerning for portal vein thrombosis.  Doppler of lower extremity showed no DVTs.  CT venogram showed completely occluded main portal vein as well as right and left main portal veins and partially occluded splenic vein, superior " mesenteric and inferior mesenteric veins with mesenteric stranding in the mesenteric root with increased pelvic ascites and no evidence of inferior vena cava compression.  9 mm right lower lobe nodule.  Reportedly had seen hematology and was told he had no malignancies though they do not have those records.  Both parents had colon cancer in their 50s and 60s and gets colonoscopies every 3 years last 1 in October 2022 with a centimeter polyp.  History of Lyme disease as well.  Recommended lifelong anticoagulation and follow-up with hematology in October for hypercoagulable workup.  -10/29/2024 gastroenterology consult Dr. Jennifer Xiong [facility].  Note indicates patient was found to have portal vein thrombosis [facility] August 2024 started on Xarelto and Plavix and feeling since that time.  Had COVID infection July 2024 then flew August 2024.  No known trauma, liver disease, or coagulation disorder.  Normal liver morphology on outside imaging.  Recommended  US for surveillance of portal vein thrombosis and liver morphology and continue Xarelto and Plavix with consultation of vascular medicine to evaluate for potential underlying coagulation disorder  -10/31/2024 vascular consult Dr. Chuck Lema at [facility] indicates patient had completely occluded main portal vein as well as right and left main portal veins August 2024.  Had partially occluded splenic vein, superior mesenteric vein, inferior mesenteric vein.  Recommend continued follow-up of these findings with CT venography of the abdomen and pelvis.  Coag workup recommended including factor V Leiden prothrombin gene mutation [facility] panel and platelet factor 4 for COVID related thromboses.  Other hypercoagulable workup deferred due to current coagulation which may affect results.  -11/5/2024 JAK2 negative.  Factor V Leiden heterozygous gene mutation.  Prothrombin gene mutation negative.  Heparin-induced platelet antibody -0.38.  Peripheral  blood flow cytometry unremarkable.    -1/24/2025 [facility] [place] otology consult: I reviewed the history in detail with the patient above.  He seems to have had cryptogenic rather massive portal vein thrombosis with persistence on ultrasound in October.  He had not had not had COVID multiple times and has had abdominal pains for several years and wonders if this could have been going on for quite some time.  From a hypercoagulable standpoint he is heterozygous factor V Leiden mutated which is a weak procoagulant by itself and I suspect unlikely by itself to have caused all this but even if it were he would stay on lifetime DOAC.  He is currently takingXarelto and Plavix.  I would stick with that.  I will complete his hypercoagulable workup by checking PNH panel, SIFE and serum viscosity, and repeat the CT of his chest for comparison to make sure the small nodule in the right lower lobe is stable as he has already been scoped and had other malignancies vetted.  I will check his PSA.Will check his antiphospholipid antibody panel as well and it would be treated with Coumadin preferentially if found to be present and can cause significant and severe thromboses.  For completeness sake I will check his protein C and S panel jumped up as well as Antithrombin III but the odds of these being culprit are statistically quite low.  Regardless of what we find he of course needs lifetime anticoagulation but it is possible that with his portal vein with mesenteric insufficiency that he is getting wobbly edema of the bowel and DOAC absorption can fail in that setting.  He needs serial ultrasonography with vascular surgery and he wants to follow at  and I recommended he get back with them and if there is progressive clot then I would probably switch him to Coumadin unless we find malignancy.  I will see him back in about a month to go over all of this.      Total time of care today inclusive of time spent today prior to patient's arrival  reviewing past data and during visit translating to patient the very complex decision tree as outlined above and after visit instituting this plan took 80 minutes patient care time throughout the day today.      Ciro Stone MD    1/24/2025

## 2025-01-25 LAB
CARDIOLIPIN IGG SER IA-ACNC: <9 GPL U/ML (ref 0–14)
CARDIOLIPIN IGM SER IA-ACNC: <9 MPL U/ML (ref 0–12)
PSA SERPL-MCNC: 1.4 NG/ML (ref 0–4)

## 2025-01-26 LAB
APTT SCREEN TO CONFIRM RATIO: 1.17 RATIO (ref 0–1.34)
AT III ACT/NOR PPP CHRO: 131 % (ref 75–135)
CONFIRM APTT/NORMAL: 60.4 SEC (ref 0–47.6)
DRVVT SCREEN TO CONFIRM RATIO: 2 RATIO (ref 0.8–1.2)
LA 2 SCREEN W REFLEX-IMP: ABNORMAL
MIXING DRVVT: 57.3 SEC (ref 0–40.4)
PROT C ACT/NOR PPP: 105 % (ref 73–180)
PROT S ACT/NOR PPP: 62 % (ref 63–140)
SCREEN APTT: 39.7 SEC (ref 0–43.5)
SCREEN DRVVT: 115.2 SEC (ref 0–47)
THROMBIN TIME: 18.7 SEC (ref 0–23)

## 2025-01-27 ENCOUNTER — TELEPHONE (OUTPATIENT)
Age: 70
End: 2025-01-27
Payer: MEDICARE

## 2025-01-27 LAB
B2 GLYCOPROT1 IGA SER-ACNC: <9 GPI IGA UNITS (ref 0–25)
B2 GLYCOPROT1 IGG SER-ACNC: <9 GPI IGG UNITS (ref 0–20)
B2 GLYCOPROT1 IGM SER-ACNC: <9 GPI IGM UNITS (ref 0–32)
PROT C AG ACT/NOR PPP IA: 90 % (ref 60–150)
PROT S AG ACT/NOR PPP IA: 84 % (ref 60–150)
PROT S FREE AG ACT/NOR PPP IA: 95 % (ref 61–136)

## 2025-01-27 NOTE — TELEPHONE ENCOUNTER
Called and discussed with patient that the [facility] profile has to be drawn M-Th and can't be drawn on Friday, asked if he could come back in to have this recollected. Patient states he needs to check his schedule and he will call back.

## 2025-01-28 DIAGNOSIS — I81 PORTAL VEIN THROMBOSIS: Primary | ICD-10-CM

## 2025-01-28 LAB
ALBUMIN SERPL ELPH-MCNC: 4 G/DL (ref 2.9–4.4)
ALBUMIN/GLOB SERPL: 1.2 {RATIO} (ref 0.7–1.7)
ALPHA1 GLOB SERPL ELPH-MCNC: 0.3 G/DL (ref 0–0.4)
ALPHA2 GLOB SERPL ELPH-MCNC: 0.8 G/DL (ref 0.4–1)
B-GLOBULIN SERPL ELPH-MCNC: 1.2 G/DL (ref 0.7–1.3)
GAMMA GLOB SERPL ELPH-MCNC: 1 G/DL (ref 0.4–1.8)
GLOBULIN SER-MCNC: 3.4 G/DL (ref 2.2–3.9)
IGA SERPL-MCNC: 343 MG/DL (ref 61–437)
IGG SERPL-MCNC: 1080 MG/DL (ref 603–1613)
IGM SERPL-MCNC: 62 MG/DL (ref 20–172)
INTERPRETATION SERPL IEP-IMP: NORMAL
LABORATORY COMMENT REPORT: NORMAL
M PROTEIN SERPL ELPH-MCNC: NORMAL G/DL
PROT SERPL-MCNC: 7.4 G/DL (ref 6–8.5)
VISC SER: 1.7 REL.SALINE (ref 1.4–2.1)

## 2025-01-28 NOTE — TELEPHONE ENCOUNTER
Caller: Kunal Pierre    Relationship: Self    Best call back number: [phone]    What was the call regarding: PATIENT CALLING BACK WILL GO TO THE [place] HEM/ONC OFFICE ON 1/29/2025 TO HAVE LAB DRAWN.    NO CALL BACK IS NEEDED UNLESS YOU NEED YOU NEED TO SPEAK HIM.

## 2025-01-28 NOTE — TELEPHONE ENCOUNTER
Called and left patient a VM informing him that he needs to come and get his [facility] profile recollected and that it has to be drawn M-Th. Left call back number for questions.

## 2025-01-30 ENCOUNTER — LAB (OUTPATIENT)
Dept: LAB | Facility: HOSPITAL | Age: 70
End: 2025-01-30
Payer: MEDICARE

## 2025-01-30 DIAGNOSIS — I81 PORTAL VEIN THROMBOSIS: ICD-10-CM

## 2025-01-30 PROCEDURE — 36415 COLL VENOUS BLD VENIPUNCTURE: CPT

## 2025-02-03 LAB — REF LAB TEST METHOD: NORMAL

## 2025-02-28 ENCOUNTER — TELEPHONE (OUTPATIENT)
Dept: ONCOLOGY | Facility: CLINIC | Age: 70
End: 2025-02-28

## 2025-02-28 NOTE — TELEPHONE ENCOUNTER
Hub staff attempted to follow warm transfer process and was unsuccessful     Caller: Kunal Pierre    Relationship to patient: Self    Best call back number: [phone]    Patient is needing: PT IS SCHEDULED TODAY WITH DR LONDON, HE CURRENTLY HAS A FEVER RUNNING NOSE, HE WAS SEEN AT URGENT CARE YESTERDAY AND TESTED NEGATIVE FOR THE FLU, COVID AND STREP. HE IS NEEDING TO KNOW IF HE SHOULD RESCHEDULE TODAY OR CAN HE COME TO HIS APPT.    PLEASE ADVISE

## 2025-02-28 NOTE — TELEPHONE ENCOUNTER
Discussed with Dr. Stone, he is okay to change appt to video visit but does not want patient to come in the office. Called patient to discuss, he does not want to do a video visit would prefer to reschedule. He is agreeable to coming in at 8:30 3/7/25.

## 2025-03-07 ENCOUNTER — OFFICE VISIT (OUTPATIENT)
Dept: ONCOLOGY | Facility: CLINIC | Age: 70
End: 2025-03-07
Payer: MEDICARE

## 2025-03-07 VITALS
DIASTOLIC BLOOD PRESSURE: 90 MMHG | BODY MASS INDEX: 26.92 KG/M2 | OXYGEN SATURATION: 97 % | HEART RATE: 78 BPM | SYSTOLIC BLOOD PRESSURE: 160 MMHG | RESPIRATION RATE: 20 BRPM | HEIGHT: 70 IN | WEIGHT: 188 LBS | TEMPERATURE: 97.7 F

## 2025-03-07 DIAGNOSIS — I81 PORTAL VEIN THROMBOSIS: Primary | ICD-10-CM

## 2025-03-07 DIAGNOSIS — R91.1 LUNG NODULE: ICD-10-CM

## 2025-03-07 RX ORDER — DOXYCYCLINE HYCLATE 100 MG
100 TABLET ORAL
COMMUNITY
Start: 2025-03-04

## 2025-03-07 RX ORDER — DILTIAZEM HYDROCHLORIDE 120 MG/1
120 CAPSULE, EXTENDED RELEASE ORAL DAILY
COMMUNITY
Start: 2025-02-18

## 2025-03-07 RX ORDER — VILAZODONE HYDROCHLORIDE 10 MG/1
TABLET ORAL
COMMUNITY
Start: 2025-02-05

## 2025-03-07 NOTE — LETTER
March 7, 2025     Edwin Barr MD  [address] [place] [address]    Patient: Kunal Pierre   YOB: 1955   Date of Visit: 3/7/2025     Dear Edwin Barr MD:       Thank you for referring Kunal Pierre to me for evaluation. Below are the relevant portions of my assessment and plan of care.    If you have questions, please do not hesitate to call me. I look forward to following Kunal along with you.         Sincerely,        Ciro Stone MD        CC: No Recipients    Ciro Stone MD  03/07/25 0907  Sign when Signing Visit  CHIEF COMPLAINT: General Fatigue    Problem List:  Oncology/Hematology History Overview Note   1.  Portal vein occlusion seen at .  History as below.  Heterozygous factor V Leiden mutation.  2.  Putative DVT lower extremity April 2024 per patient that was briefly heparinized and then the next day was told this was not the case and anticoagulation was not given further.  This was negative.  3.  Coronary artery disease  4.  COVID and flu  5.  History of Lyme disease  6.  History of colon polyps    - 8/17/2024 gastroenterology consultation Dr. Hairston  with past history of coronary disease stenting 2010 with hypertension hyperlipidemia diabetes with recent COVID and flu with portal vein thrombosis given thrombolytic and April 2024 with DVT left lower extremity.  Prescription for anticoagulation given but apparently never made it to the pharmacy and apparently was not taken.  8/1/2024 CT outside hospital concerning for portal vein thrombosis.  Doppler of lower extremity showed no DVTs.  CT venogram showed completely occluded main portal vein as well as right and left main portal veins and partially occluded splenic vein, superior mesenteric and inferior mesenteric veins with mesenteric stranding in the mesenteric root with increased pelvic ascites and no evidence of inferior vena cava compression.  9 mm right lower lobe nodule.  Reportedly had  seen hematology and was told he had no malignancies though they do not have those records.  Both parents had colon cancer in their 50s and 60s and gets colonoscopies every 3 years last 1 in October 2022 with a centimeter polyp.  History of Lyme disease as well.  Recommended lifelong anticoagulation and follow-up with hematology in October for hypercoagulable workup.  -10/29/2024 gastroenterology consult Dr. Jennifer Xiong [facility].  Note indicates patient was found to have portal vein thrombosis [facility] August 2024 started on Xarelto and Plavix and feeling since that time.  Had COVID infection July 2024 then flew August 2024.  No known trauma, liver disease, or coagulation disorder.  Normal liver morphology on outside imaging.  Recommended  US for surveillance of portal vein thrombosis and liver morphology and continue Xarelto and Plavix with consultation of vascular medicine to evaluate for potential underlying coagulation disorder  -10/31/2024 vascular consult Dr. Chuck Lema at [facility] indicates patient had completely occluded main portal vein as well as right and left main portal veins August 2024.  Had partially occluded splenic vein, superior mesenteric vein, inferior mesenteric vein.  Recommend continued follow-up of these findings with CT venography of the abdomen and pelvis.  Coag workup recommended including factor V Leiden prothrombin gene mutation PNH panel and platelet factor 4 for COVID related thromboses.  Other hypercoagulable workup deferred due to current coagulation which may affect results.  -11/5/2024 JAK2 negative.  Factor V Leiden heterozygous gene mutation.  Prothrombin gene mutation negative.  Heparin-induced platelet antibody -0.38.  Peripheral blood flow cytometry unremarkable.    -1/24/2025 [facility] hematology consult: I reviewed the history in detail with the patient above.  He seems to have had cryptogenic rather massive portal vein thrombosis with  persistence on ultrasound in October.  He had not had not had COVID multiple times and has had abdominal pains for several years and wonders if this could have been going on for quite some time.  From a hypercoagulable standpoint he is heterozygous factor V Leiden mutated which is a weak procoagulant by itself and I suspect unlikely by itself to have caused all this but even if it were he would stay on lifetime DOAC.  He is currently takingXarelto and Plavix.  I would stick with that.  I will complete his hypercoagulable workup by checking PNH panel, SIFE and serum viscosity, and repeat the CT of his chest for comparison to make sure the small nodule in the right lower lobe is stable as he has already been scoped and had other malignancies vetted.  I will check his PSA.Will check his antiphospholipid antibody panel as well and it would be treated with Coumadin preferentially if found to be present and can cause significant and severe thromboses.  For completeness sake I will check his protein C and S panel jumped up as well as Antithrombin III but the odds of these being culprit are statistically quite low.  Regardless of what we find he of course needs lifetime anticoagulation but it is possible that with his portal vein with mesenteric insufficiency that he is getting wobbly edema of the bowel and DOAC absorption can fail in that setting.  He needs serial ultrasonography with vascular surgery and he wants to follow at  and I recommended he get back with them and if there is progressive clot then I would probably switch him to Coumadin unless we find malignancy.  I will see him back in about a month to go over all of this.    -1/24/2025 PSA normal 1.4.  Anticardiolipin IgG IgM/beta-2 glycoprotein IgG IgA IgM negative.  Lupus anticoagulant positive but on anticoagulation obfuscating results.  Antithrombin %  Protein C antigen 90%.  Protein C activity 105%  Protein S antigen total 84% free 95%.  Protein S  functional 62% lower limit of normal 63%  SIFE plus PE showed no monoclonality and normal quantitative immunoglobulins.  Normal serum viscosity.  PNH panel negative    -3/7/2025 [facility] follow-up: Save for his heterozygous factor V Leiden mutation, the only other finding on his hypercoagulable workup was a positive lupus anticoagulant that I suspect is due to him being on the Xarelto and is falsely positive, I do not think any of the above is likely contributory to his clot.  To have antiphospholipid antibody syndrome I would expect him to have more than just the lupus anticoagulant and I would not put him on lifetime Coumadin on that basis alone.  Certainly if on repeat it is negative I would not call this antiphospholipid antibody syndrome with normal anticardiolipin and beta-2 glycoprotein testing.  I will check CT of his chest as mentioned last time and we will make referral to [facility] vascular surgery for follow-up as per the recommendation of [facility].  As others have stated, though he could have had chronic COVID and COVID-related thrombosis, I would be hesitant to lay this all off on COVID and stop his anticoagulation finding out the hard way that there was more going on cryptic lead to cause this clot and have his thrombosis worsen which would threaten his hepatic/portal function further.  Hence I would recommend lifetime DOAC and he continues Plavix as well per prior recommendation of vascular medicine at [facility] which is fine.  I will also repeat his CBC in light of his fatigue     Portal vein thrombosis   1/24/2025 Initial Diagnosis    Portal vein thrombosis         HISTORY OF PRESENT ILLNESS:  The patient is a 69 y.o. male, here for follow up on management of portal vein occlusion with heterozygous factor V Leiden mutation and positive lupus anticoagulant on anticoagulation.  Other than for nondescript but significant fatigue he has no other complaints.    Past Medical History:  "  Diagnosis Date   • Hypertension      Past Surgical History:   Procedure Laterality Date   • CARDIAC CATHETERIZATION     • CARDIAC CATHETERIZATION N/A 10/29/2019    Procedure: Left Heart Cath;  Surgeon: Kavon Fung MD;  Location: [facility] CATH INVASIVE LOCATION;  Service: Cardiology   • CORONARY ANGIOPLASTY WITH STENT PLACEMENT         Allergies   Allergen Reactions   • Statins Myalgia       Family History and Social History reviewed and changed as necessary    REVIEW OF SYSTEM:   General Fatigue    PHYSICAL EXAM:  No lower extremity swelling or abdominal swelling or shifting dullness.  No palpable cords.  No respiratory distress.  No rashes.  No palpable purpura.  No synovitis.    Vitals:    03/07/25 0759   BP: 160/90   Pulse: 78   Resp: 20   Temp: 97.7 °F (36.5 °C)   TempSrc: Temporal   SpO2: 97%   Weight: 85.3 kg (188 lb)   Height: 177.8 cm (70\")     Vitals:    03/07/25 0759   PainSc: 0-No pain          ECOG score: 0           Vitals reviewed.    Lab Results   Component Value Date    HGB 13.6 (L) 08/05/2024    HCT 39.7 (L) 08/05/2024    MCV 79 08/05/2024     (L) 08/05/2024    WBC 4.78 08/05/2024    NEUTROABS 4.38 08/02/2024    LYMPHSABS 1.36 08/02/2024    MONOSABS 0.59 08/02/2024    EOSABS 0.09 08/02/2024    BASOSABS 0.03 08/02/2024       Lab Results   Component Value Date    GLUCOSE 103 (H) 10/29/2019    BUN 13 10/29/2019    CREATININE 1.00 10/29/2019     10/29/2019    K 4.9 10/29/2019     10/29/2019    CO2 31.0 (H) 10/29/2019    CALCIUM 9.5 10/29/2019    PROTEINTOT 7.4 01/24/2025    ALBUMIN 4.0 01/24/2025             ASSESSMENT & PLAN:  1.  Portal vein occlusion seen at .  History as below  2.  Putative DVT lower extremity April 2024 per patient that was briefly heparinized and then the next day was told this was not the case and anticoagulation was not given further.  This was negative.  3.  Coronary artery disease  4.  COVID and flu  5.  History of Lyme disease  6.  History of " colon polyps    - 8/17/2024   gastroenterology consultation Dr. Hairston  with past history of coronary disease stenting 2010 with hypertension hyperlipidemia diabetes with recent COVID and flu with portal vein thrombosis given thrombolytic and April 2024 with DVT left lower extremity.  Prescription for anticoagulation given but apparently never made it to the pharmacy and apparently was not taken.  8/1/2024 CT outside hospital concerning for portal vein thrombosis.  Doppler of lower extremity showed no DVTs.  CT venogram showed completely occluded main portal vein as well as right and left main portal veins and partially occluded splenic vein, superior mesenteric and inferior mesenteric veins with mesenteric stranding in the mesenteric root with increased pelvic ascites and no evidence of inferior vena cava compression.  9 mm right lower lobe nodule.  Reportedly had seen hematology and was told he had no malignancies though they do not have those records.  Both parents had colon cancer in their 50s and 60s and gets colonoscopies every 3 years last 1 in October 2022 with a centimeter polyp.  History of Lyme disease as well.  Recommended lifelong anticoagulation and follow-up with hematology in October for hypercoagulable workup.  -10/29/2024 gastroenterology consult Dr. Jennifer Xiong [facility].  Note indicates patient was found to have portal vein thrombosis [facility] August 2024 started on Xarelto and Plavix and feeling since that time.  Had COVID infection July 2024 then flew August 2024.  No known trauma, liver disease, or coagulation disorder.  Normal liver morphology on outside imaging.  Recommended  US for surveillance of portal vein thrombosis and liver morphology and continue Xarelto and Plavix with consultation of vascular medicine to evaluate for potential underlying coagulation disorder  -10/31/2024 vascular consult Dr. Chuck Lema at [facility] indicates patient had  completely occluded main portal vein as well as right and left main portal veins August 2024.  Had partially occluded splenic vein, superior mesenteric vein, inferior mesenteric vein.  Recommend continued follow-up of these findings with CT venography of the abdomen and pelvis.  Coag workup recommended including factor V Leiden prothrombin gene mutation PNH panel and platelet factor 4 for COVID related thromboses.  Other hypercoagulable workup deferred due to current coagulation which may affect results.  -11/5/2024 JAK2 negative.  Factor V Leiden heterozygous gene mutation.  Prothrombin gene mutation negative.  Heparin-induced platelet antibody -0.38.  Peripheral blood flow cytometry unremarkable.    -1/24/2025 [facility] hematology consult: I reviewed the history in detail with the patient above.  He seems to have had cryptogenic rather massive portal vein thrombosis with persistence on ultrasound in October.  He had not had not had COVID multiple times and has had abdominal pains for several years and wonders if this could have been going on for quite some time.  From a hypercoagulable standpoint he is heterozygous factor V Leiden mutated which is a weak procoagulant by itself and I suspect unlikely by itself to have caused all this but even if it were he would stay on lifetime DOAC.  He is currently takingXarelto and Plavix.  I would stick with that.  I will complete his hypercoagulable workup by checking PNH panel, SIFE and serum viscosity, and repeat the CT of his chest for comparison to make sure the small nodule in the right lower lobe is stable as he has already been scoped and had other malignancies vetted.  I will check his PSA.Will check his antiphospholipid antibody panel as well and it would be treated with Coumadin preferentially if found to be present and can cause significant and severe thromboses.  For completeness sake I will check his protein C and S panel jumped up as well as Antithrombin III but  the odds of these being culprit are statistically quite low.  Regardless of what we find he of course needs lifetime anticoagulation but it is possible that with his portal vein with mesenteric insufficiency that he is getting wobbly edema of the bowel and DOAC absorption can fail in that setting.  He needs serial ultrasonography with vascular surgery and he wants to follow at  and I recommended he get back with them and if there is progressive clot then I would probably switch him to Coumadin unless we find malignancy.  I will see him back in about a month to go over all of this.    -1/24/2025 PSA normal 1.4.  Anticardiolipin IgG IgM/beta-2 glycoprotein IgG IgA IgM negative.  Lupus anticoagulant positive but on anticoagulation obfuscating results.  Antithrombin %  Protein C antigen 90%.  Protein C activity 105%  Protein S antigen total 84% free 95%.  Protein S functional 62% lower limit of normal 63%  SIFE plus PE showed no monoclonality and normal quantitative immunoglobulins.  Normal serum viscosity.  PNH panel negative    -3/7/2025 [facility] hematology follow-up: Save for his heterozygous factor V Leiden mutation, the only other finding on his hypercoagulable workup was a positive lupus anticoagulant that I suspect is due to him being on the Xarelto and is falsely positive, I do not think any of the above is likely contributory to his clot.  To have antiphospholipid antibody syndrome I would expect him to have more than just the lupus anticoagulant and I would not put him on lifetime Coumadin on that basis alone.  Certainly if on repeat it is negative I would not call this antiphospholipid antibody syndrome with normal anticardiolipin and beta-2 glycoprotein testing.  I will check CT of his chest as mentioned last time and we will make referral to  vascular surgery for follow-up as per the recommendation of [facility].  As others have stated, though he could have had chronic COVID and  COVID-related thrombosis, I would be hesitant to lay this all off on COVID and stop his anticoagulation finding out the hard way that there was more going on cryptic lead to cause this clot and have his thrombosis worsen which would threaten his hepatic/portal function further.  Hence I would recommend lifetime DOAC and he continues Plavix as well per prior recommendation of vascular medicine at [facility] which is fine.  I will also repeat his CBC in light of his fatigue    Total time of care today inclusive of time spent today prior to patient's arrival reviewing interval data and during visit translating to patient putting forth plan as outlined above and after visit instituting this plan took 45 minutes patient care time throughout the day today.  Ciro Stone MD    03/07/2025

## 2025-03-07 NOTE — PROGRESS NOTES
CHIEF COMPLAINT: General Fatigue    Problem List:  Oncology/Hematology History Overview Note   1.  Portal vein occlusion seen at .  History as below.  Heterozygous factor V Leiden mutation.  2.  Putative DVT lower extremity April 2024 per patient that was briefly heparinized and then the next day was told this was not the case and anticoagulation was not given further.  This was negative.  3.  Coronary artery disease  4.  COVID and flu  5.  History of Lyme disease  6.  History of colon polyps    - 8/17/2024 gastroenterology consultation Dr. Hairston  with past history of coronary disease stenting 2010 with hypertension hyperlipidemia diabetes with recent COVID and flu with portal vein thrombosis given thrombolytic and April 2024 with DVT left lower extremity.  Prescription for anticoagulation given but apparently never made it to the pharmacy and apparently was not taken.  8/1/2024 CT outside hospital concerning for portal vein thrombosis.  Doppler of lower extremity showed no DVTs.  CT venogram showed completely occluded main portal vein as well as right and left main portal veins and partially occluded splenic vein, superior mesenteric and inferior mesenteric veins with mesenteric stranding in the mesenteric root with increased pelvic ascites and no evidence of inferior vena cava compression.  9 mm right lower lobe nodule.  Reportedly had seen hematology and was told he had no malignancies though they do not have those records.  Both parents had colon cancer in their 50s and 60s and gets colonoscopies every 3 years last 1 in October 2022 with a centimeter polyp.  History of Lyme disease as well.  Recommended lifelong anticoagulation and follow-up with hematology in October for hypercoagulable workup.  -10/29/2024 gastroenterology consult Dr. Jennifer Xiong [facility].  Note indicates patient was found to have portal vein thrombosis [facility] August 2024 started on Xarelto and Plavix and feeling  since that time.  Had COVID infection July 2024 then flew August 2024.  No known trauma, liver disease, or coagulation disorder.  Normal liver morphology on outside imaging.  Recommended LV US for surveillance of portal vein thrombosis and liver morphology and continue Xarelto and Plavix with consultation of vascular medicine to evaluate for potential underlying coagulation disorder  -10/31/2024 vascular consult Dr. Chuck Lema at [facility] indicates patient had completely occluded main portal vein as well as right and left main portal veins August 2024.  Had partially occluded splenic vein, superior mesenteric vein, inferior mesenteric vein.  Recommend continued follow-up of these findings with CT venography of the abdomen and pelvis.  Coag workup recommended including factor V Leiden prothrombin gene mutation PNH panel and platelet factor 4 for COVID related thromboses.  Other hypercoagulable workup deferred due to current coagulation which may affect results.  -11/5/2024 JAK2 negative.  Factor V Leiden heterozygous gene mutation.  Prothrombin gene mutation negative.  Heparin-induced platelet antibody -0.38.  Peripheral blood flow cytometry unremarkable.    -1/24/2025 [facility] consult: I reviewed the history in detail with the patient above.  He seems to have had cryptogenic rather massive portal vein thrombosis with persistence on ultrasound in October.  He had not had not had COVID multiple times and has had abdominal pains for several years and wonders if this could have been going on for quite some time.  From a hypercoagulable standpoint he is heterozygous factor V Leiden mutated which is a weak procoagulant by itself and I suspect unlikely by itself to have caused all this but even if it were he would stay on lifetime DOAC.  He is currently takingXarelto and Plavix.  I would stick with that.  I will complete his hypercoagulable workup by checking PNH panel, SIFE and serum viscosity, and  repeat the CT of his chest for comparison to make sure the small nodule in the right lower lobe is stable as he has already been scoped and had other malignancies vetted.  I will check his PSA.Will check his antiphospholipid antibody panel as well and it would be treated with Coumadin preferentially if found to be present and can cause significant and severe thromboses.  For completeness sake I will check his protein C and S panel jumped up as well as Antithrombin III but the odds of these being culprit are statistically quite low.  Regardless of what we find he of course needs lifetime anticoagulation but it is possible that with his portal vein with mesenteric insufficiency that he is getting wobbly edema of the bowel and DOAC absorption can fail in that setting.  He needs serial ultrasonography with vascular surgery and he wants to follow at  and I recommended he get back with them and if there is progressive clot then I would probably switch him to Coumadin unless we find malignancy.  I will see him back in about a month to go over all of this.    -1/24/2025 PSA normal 1.4.  Anticardiolipin IgG IgM/beta-2 glycoprotein IgG IgA IgM negative.  Lupus anticoagulant positive but on anticoagulation obfuscating results.  Antithrombin %  Protein C antigen 90%.  Protein C activity 105%  Protein S antigen total 84% free 95%.  Protein S functional 62% lower limit of normal 63%  SIFE plus PE showed no monoclonality and normal quantitative immunoglobulins.  Normal serum viscosity.  PNH panel negative    -3/7/2025 [facility] follow-up: Save for his heterozygous factor V Leiden mutation, the only other finding on his hypercoagulable workup was a positive lupus anticoagulant that I suspect is due to him being on the Xarelto and is falsely positive, I do not think any of the above is likely contributory to his clot.  To have antiphospholipid antibody syndrome I would expect him to have more than just the lupus  anticoagulant and I would not put him on lifetime Coumadin on that basis alone.  Certainly if on repeat it is negative I would not call this antiphospholipid antibody syndrome with normal anticardiolipin and beta-2 glycoprotein testing.  I will check CT of his chest as mentioned last time and we will make referral to [facility] surgery for follow-up as per the recommendation of [facility].  As others have stated, though he could have had chronic COVID and COVID-related thrombosis, I would be hesitant to lay this all off on COVID and stop his anticoagulation finding out the hard way that there was more going on cryptic lead to cause this clot and have his thrombosis worsen which would threaten his hepatic/portal function further.  Hence I would recommend lifetime DOAC and he continues Plavix as well per prior recommendation of vascular medicine at [facility] which is fine.  I will also repeat his CBC in light of his fatigue     Portal vein thrombosis   1/24/2025 Initial Diagnosis    Portal vein thrombosis         HISTORY OF PRESENT ILLNESS:  The patient is a 69 y.o. male, here for follow up on management of portal vein occlusion with heterozygous factor V Leiden mutation and positive lupus anticoagulant on anticoagulation.  Other than for nondescript but significant fatigue he has no other complaints.    Past Medical History:   Diagnosis Date    Hypertension      Past Surgical History:   Procedure Laterality Date    CARDIAC CATHETERIZATION      CARDIAC CATHETERIZATION N/A 10/29/2019    Procedure: Left Heart Cath;  Surgeon: Kavon Fung MD;  Location: [facility] CATH INVASIVE LOCATION;  Service: Cardiology    CORONARY ANGIOPLASTY WITH STENT PLACEMENT         Allergies   Allergen Reactions    Statins Myalgia       Family History and Social History reviewed and changed as necessary    REVIEW OF SYSTEM:   General Fatigue    PHYSICAL EXAM:  No lower extremity swelling or abdominal swelling or shifting  "dullness.  No palpable cords.  No respiratory distress.  No rashes.  No palpable purpura.  No synovitis.    Vitals:    03/07/25 0759   BP: 160/90   Pulse: 78   Resp: 20   Temp: 97.7 °F (36.5 °C)   TempSrc: Temporal   SpO2: 97%   Weight: 85.3 kg (188 lb)   Height: 177.8 cm (70\")     Vitals:    03/07/25 0759   PainSc: 0-No pain          ECOG score: 0           Vitals reviewed.    Lab Results   Component Value Date    HGB 13.6 (L) 08/05/2024    HCT 39.7 (L) 08/05/2024    MCV 79 08/05/2024     (L) 08/05/2024    WBC 4.78 08/05/2024    NEUTROABS 4.38 08/02/2024    LYMPHSABS 1.36 08/02/2024    MONOSABS 0.59 08/02/2024    EOSABS 0.09 08/02/2024    BASOSABS 0.03 08/02/2024       Lab Results   Component Value Date    GLUCOSE 103 (H) 10/29/2019    BUN 13 10/29/2019    CREATININE 1.00 10/29/2019     10/29/2019    K 4.9 10/29/2019     10/29/2019    CO2 31.0 (H) 10/29/2019    CALCIUM 9.5 10/29/2019    PROTEINTOT 7.4 01/24/2025    ALBUMIN 4.0 01/24/2025             ASSESSMENT & PLAN:  1.  Portal vein occlusion seen at .  History as below  2.  Putative DVT lower extremity April 2024 per patient that was briefly heparinized and then the next day was told this was not the case and anticoagulation was not given further.  This was negative.  3.  Coronary artery disease  4.  COVID and flu  5.  History of Lyme disease  6.  History of colon polyps    - 8/17/2024   gastroenterology consultation Dr. Yovanny MA with past history of coronary disease stenting 2010 with hypertension hyperlipidemia diabetes with recent COVID and flu with portal vein thrombosis given thrombolytic and April 2024 with DVT left lower extremity.  Prescription for anticoagulation given but apparently never made it to the pharmacy and apparently was not taken.  8/1/2024 CT outside hospital concerning for portal vein thrombosis.  Doppler of lower extremity showed no DVTs.  CT venogram showed completely occluded main portal vein as well as right and " left main portal veins and partially occluded splenic vein, superior mesenteric and inferior mesenteric veins with mesenteric stranding in the mesenteric root with increased pelvic ascites and no evidence of inferior vena cava compression.  9 mm right lower lobe nodule.  Reportedly had seen hematology and was told he had no malignancies though they do not have those records.  Both parents had colon cancer in their 50s and 60s and gets colonoscopies every 3 years last 1 in October 2022 with a centimeter polyp.  History of Lyme disease as well.  Recommended lifelong anticoagulation and follow-up with hematology in October for hypercoagulable workup.  -10/29/2024 gastroenterology consult Dr. Jennifer Xiong [facility].  Note indicates patient was found to have portal vein thrombosis [facility] August 2024 started on Xarelto and Plavix and feeling since that time.  Had COVID infection July 2024 then flew August 2024.  No known trauma, liver disease, or coagulation disorder.  Normal liver morphology on outside imaging.  Recommended LV US for surveillance of portal vein thrombosis and liver morphology and continue Xarelto and Plavix with consultation of vascular medicine to evaluate for potential underlying coagulation disorder  -10/31/2024 vascular consult Dr. Chuck Lema at [facility] indicates patient had completely occluded main portal vein as well as right and left main portal veins August 2024.  Had partially occluded splenic vein, superior mesenteric vein, inferior mesenteric vein.  Recommend continued follow-up of these findings with CT venography of the abdomen and pelvis.  Coag workup recommended including factor V Leiden prothrombin gene mutation PNH panel and platelet factor 4 for COVID related thromboses.  Other hypercoagulable workup deferred due to current coagulation which may affect results.  -11/5/2024 JAK2 negative.  Factor V Leiden heterozygous gene mutation.  Prothrombin gene  mutation negative.  Heparin-induced platelet antibody -0.38.  Peripheral blood flow cytometry unremarkable.    -1/24/2025 [facility] hematology consult: I reviewed the history in detail with the patient above.  He seems to have had cryptogenic rather massive portal vein thrombosis with persistence on ultrasound in October.  He had not had not had COVID multiple times and has had abdominal pains for several years and wonders if this could have been going on for quite some time.  From a hypercoagulable standpoint he is heterozygous factor V Leiden mutated which is a weak procoagulant by itself and I suspect unlikely by itself to have caused all this but even if it were he would stay on lifetime DOAC.  He is currently takingXarelto and Plavix.  I would stick with that.  I will complete his hypercoagulable workup by checking PNH panel, SIFE and serum viscosity, and repeat the CT of his chest for comparison to make sure the small nodule in the right lower lobe is stable as he has already been scoped and had other malignancies vetted.  I will check his PSA.Will check his antiphospholipid antibody panel as well and it would be treated with Coumadin preferentially if found to be present and can cause significant and severe thromboses.  For completeness sake I will check his protein C and S panel jumped up as well as Antithrombin III but the odds of these being culprit are statistically quite low.  Regardless of what we find he of course needs lifetime anticoagulation but it is possible that with his portal vein with mesenteric insufficiency that he is getting wobbly edema of the bowel and DOAC absorption can fail in that setting.  He needs serial ultrasonography with vascular surgery and he wants to follow at  and I recommended he get back with them and if there is progressive clot then I would probably switch him to Coumadin unless we find malignancy.  I will see him back in about a month to go over all of  this.    -1/24/2025 PSA normal 1.4.  Anticardiolipin IgG IgM/beta-2 glycoprotein IgG IgA IgM negative.  Lupus anticoagulant positive but on anticoagulation obfuscating results.  Antithrombin %  Protein C antigen 90%.  Protein C activity 105%  Protein S antigen total 84% free 95%.  Protein S functional 62% lower limit of normal 63%  SIFE plus PE showed no monoclonality and normal quantitative immunoglobulins.  Normal serum viscosity.  PNH panel negative    -3/7/2025 [facility] follow-up: Save for his heterozygous factor V Leiden mutation, the only other finding on his hypercoagulable workup was a positive lupus anticoagulant that I suspect is due to him being on the Xarelto and is falsely positive, I do not think any of the above is likely contributory to his clot.  To have antiphospholipid antibody syndrome I would expect him to have more than just the lupus anticoagulant and I would not put him on lifetime Coumadin on that basis alone.  Certainly if on repeat it is negative I would not call this antiphospholipid antibody syndrome with normal anticardiolipin and beta-2 glycoprotein testing.  I will check CT of his chest as mentioned last time and we will make referral to [facility] surgery for follow-up as per the recommendation of [facility].  As others have stated, though he could have had chronic COVID and COVID-related thrombosis, I would be hesitant to lay this all off on COVID and stop his anticoagulation finding out the hard way that there was more going on cryptic lead to cause this clot and have his thrombosis worsen which would threaten his hepatic/portal function further.  Hence I would recommend lifetime DOAC and he continues Plavix as well per prior recommendation of vascular medicine at [facility] which is fine.  I will also repeat his CBC in light of his fatigue    Total time of care today inclusive of time spent today prior to patient's arrival reviewing interval data and  during visit translating to patient putting forth plan as outlined above and after visit instituting this plan took 45 minutes patient care time throughout the day today.  Ciro Stone MD    03/07/2025

## 2025-06-20 ENCOUNTER — RESULTS FOLLOW-UP (OUTPATIENT)
Dept: ONCOLOGY | Facility: CLINIC | Age: 70
End: 2025-06-20
Payer: MEDICARE

## 2025-06-20 NOTE — TELEPHONE ENCOUNTER
----- Message from Ciro Stone sent at 6/20/2025 12:35 PM EDT -----  Regarding: pancytopenia  Call patient and see whether he intends to follow up with me.  He cancelled his 3/2025 follow up. Dr. Barr sent me a cbc showing pancytopenia that goes with his portal vein thrombosis.If patient   does not intend to follow up with me, let Dr. Barr know the patient cancelled his 3/2025 follow up with me and I have no input on his pancytopenia on labs Dr. Barr sent..    ----- Message -----  From: Interface, Scans Incoming  Sent: 6/20/2025   6:07 AM EDT  To: Ciro Stone MD

## 2025-06-20 NOTE — TELEPHONE ENCOUNTER
Called patient to discuss, he states that he does want to schedule a follow up appt. 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

## 2025-07-08 ENCOUNTER — HOSPITAL ENCOUNTER (OUTPATIENT)
Dept: CT IMAGING | Facility: HOSPITAL | Age: 70
Discharge: HOME | End: 2025-07-08
Admitting: INTERNAL MEDICINE
Payer: MEDICARE

## 2025-07-08 DIAGNOSIS — R91.1 LUNG NODULE: ICD-10-CM

## 2025-07-08 PROCEDURE — 25510000001 IOPAMIDOL 61 % SOLUTION: Performed by: INTERNAL MEDICINE

## 2025-07-08 PROCEDURE — 71260 CT THORAX DX C+: CPT

## 2025-07-08 RX ORDER — IOPAMIDOL 612 MG/ML
100 INJECTION, SOLUTION INTRAVASCULAR
Status: COMPLETED | OUTPATIENT
Start: 2025-07-08 | End: 2025-07-08

## 2025-07-08 RX ADMIN — IOPAMIDOL 90 ML: 612 INJECTION, SOLUTION INTRAVENOUS at 14:21

## 2025-07-14 ENCOUNTER — LAB (OUTPATIENT)
Dept: LAB | Facility: HOSPITAL | Age: 70
End: 2025-07-14
Payer: MEDICARE

## 2025-07-14 ENCOUNTER — OFFICE VISIT (OUTPATIENT)
Dept: ONCOLOGY | Facility: CLINIC | Age: 70
End: 2025-07-14
Payer: MEDICARE

## 2025-07-14 VITALS
SYSTOLIC BLOOD PRESSURE: 155 MMHG | RESPIRATION RATE: 18 BRPM | HEIGHT: 70 IN | HEART RATE: 69 BPM | WEIGHT: 198 LBS | OXYGEN SATURATION: 96 % | TEMPERATURE: 97.8 F | DIASTOLIC BLOOD PRESSURE: 79 MMHG | BODY MASS INDEX: 28.35 KG/M2

## 2025-07-14 DIAGNOSIS — G47.30 SLEEP APNEA, UNSPECIFIED TYPE: ICD-10-CM

## 2025-07-14 DIAGNOSIS — D75.1 SECONDARY POLYCYTHEMIA: ICD-10-CM

## 2025-07-14 DIAGNOSIS — I81 PORTAL VEIN THROMBOSIS: Primary | ICD-10-CM

## 2025-07-14 DIAGNOSIS — R91.8 PULMONARY NODULES: ICD-10-CM

## 2025-07-14 DIAGNOSIS — I81 PORTAL VEIN THROMBOSIS: ICD-10-CM

## 2025-07-14 LAB
BASOPHILS # BLD AUTO: 0.03 10*3/MM3 (ref 0–0.2)
BASOPHILS NFR BLD AUTO: 0.7 % (ref 0–1.5)
DEPRECATED RDW RBC AUTO: 42.4 FL (ref 37–54)
EOSINOPHIL # BLD AUTO: 0.1 10*3/MM3 (ref 0–0.4)
EOSINOPHIL NFR BLD AUTO: 2.3 % (ref 0.3–6.2)
ERYTHROCYTE [DISTWIDTH] IN BLOOD BY AUTOMATED COUNT: 15.2 % (ref 12.3–15.4)
HCT VFR BLD AUTO: 55 % (ref 37.5–51)
HGB BLD-MCNC: 18.9 G/DL (ref 13–17.7)
IMM GRANULOCYTES # BLD AUTO: 0.01 10*3/MM3 (ref 0–0.05)
IMM GRANULOCYTES NFR BLD AUTO: 0.2 % (ref 0–0.5)
LYMPHOCYTES # BLD AUTO: 1.4 10*3/MM3 (ref 0.7–3.1)
LYMPHOCYTES NFR BLD AUTO: 32.1 % (ref 19.6–45.3)
MCH RBC QN AUTO: 27.5 PG (ref 26.6–33)
MCHC RBC AUTO-ENTMCNC: 34.4 G/DL (ref 31.5–35.7)
MCV RBC AUTO: 79.9 FL (ref 79–97)
MONOCYTES # BLD AUTO: 0.39 10*3/MM3 (ref 0.1–0.9)
MONOCYTES NFR BLD AUTO: 8.9 % (ref 5–12)
NEUTROPHILS NFR BLD AUTO: 2.43 10*3/MM3 (ref 1.7–7)
NEUTROPHILS NFR BLD AUTO: 55.8 % (ref 42.7–76)
PLATELET # BLD AUTO: 78 10*3/MM3 (ref 140–450)
PMV BLD AUTO: 10 FL (ref 6–12)
RBC # BLD AUTO: 6.88 10*6/MM3 (ref 4.14–5.8)
WBC NRBC COR # BLD AUTO: 4.36 10*3/MM3 (ref 3.4–10.8)

## 2025-07-14 PROCEDURE — 99215 OFFICE O/P EST HI 40 MIN: CPT | Performed by: INTERNAL MEDICINE

## 2025-07-14 PROCEDURE — 85613 RUSSELL VIPER VENOM DILUTED: CPT

## 2025-07-14 PROCEDURE — 85025 COMPLETE CBC W/AUTO DIFF WBC: CPT

## 2025-07-14 PROCEDURE — 1160F RVW MEDS BY RX/DR IN RCRD: CPT | Performed by: INTERNAL MEDICINE

## 2025-07-14 PROCEDURE — 85670 THROMBIN TIME PLASMA: CPT

## 2025-07-14 PROCEDURE — 1159F MED LIST DOCD IN RCRD: CPT | Performed by: INTERNAL MEDICINE

## 2025-07-14 PROCEDURE — 85705 THROMBOPLASTIN INHIBITION: CPT

## 2025-07-14 PROCEDURE — 36415 COLL VENOUS BLD VENIPUNCTURE: CPT

## 2025-07-14 PROCEDURE — 1126F AMNT PAIN NOTED NONE PRSNT: CPT | Performed by: INTERNAL MEDICINE

## 2025-07-14 PROCEDURE — 85732 THROMBOPLASTIN TIME PARTIAL: CPT

## 2025-07-14 RX ORDER — METOPROLOL SUCCINATE 25 MG/1
25 TABLET, EXTENDED RELEASE ORAL DAILY
COMMUNITY
Start: 2025-03-19 | End: 2026-03-19

## 2025-07-14 RX ORDER — AMLODIPINE BESYLATE 10 MG/1
10 TABLET ORAL DAILY
COMMUNITY
Start: 2025-04-10 | End: 2026-04-10

## 2025-07-14 NOTE — LETTER
July 14, 2025     Edwin Barr MD  [address] [place] [address]    Patient: Kunal Pierre   YOB: 1955   Date of Visit: 7/14/2025     Dear Edwin Barr MD:       Thank you for referring Kunal Pierre to me for evaluation. Below are the relevant portions of my assessment and plan of care.    If you have questions, please do not hesitate to call me. I look forward to following Kunal along with you.         Sincerely,        Ciro Stone MD        CC: No Recipients    Ciro Stone MD  07/14/25 0906  Sign when Signing Visit  CHIEF COMPLAINT: Excessive daytime somnolence    Problem List:  Oncology/Hematology History Overview Note   1.  Portal vein occlusion seen at .  History as below.  Heterozygous factor V Leiden mutation.  2.  Putative DVT lower extremity April 2024 per patient that was briefly heparinized and then the next day was told this was not the case and anticoagulation was not given further.  This was negative.  3.  Coronary artery disease  4.  COVID and flu  5.  History of Lyme disease  6.  History of colon polyps    - 8/17/2024 gastroenterology consultation Dr. Hairston  with past history of coronary disease stenting 2010 with hypertension hyperlipidemia diabetes with recent COVID and flu with portal vein thrombosis given thrombolytic and April 2024 with DVT left lower extremity.  Prescription for anticoagulation given but apparently never made it to the pharmacy and apparently was not taken.  8/1/2024 CT outside hospital concerning for portal vein thrombosis.  Doppler of lower extremity showed no DVTs.  CT venogram showed completely occluded main portal vein as well as right and left main portal veins and partially occluded splenic vein, superior mesenteric and inferior mesenteric veins with mesenteric stranding in the mesenteric root with increased pelvic ascites and no evidence of inferior vena cava compression.  9 mm right lower lobe nodule.   Reportedly had seen hematology and was told he had no malignancies though they do not have those records.  Both parents had colon cancer in their 50s and 60s and gets colonoscopies every 3 years last 1 in October 2022 with a centimeter polyp.  History of Lyme disease as well.  Recommended lifelong anticoagulation and follow-up with hematology in October for hypercoagulable workup.  -10/29/2024 gastroenterology consult Dr. Jennifer Xiong [facility].  Note indicates patient was found to have portal vein thrombosis [facility] August 2024 started on Xarelto and Plavix and feeling since that time.  Had COVID infection July 2024 then flew August 2024.  No known trauma, liver disease, or coagulation disorder.  Normal liver morphology on outside imaging.  Recommended  US for surveillance of portal vein thrombosis and liver morphology and continue Xarelto and Plavix with consultation of vascular medicine to evaluate for potential underlying coagulation disorder  -10/31/2024 vascular consult Dr. Chuck Lema at [facility] indicates patient had completely occluded main portal vein as well as right and left main portal veins August 2024.  Had partially occluded splenic vein, superior mesenteric vein, inferior mesenteric vein.  Recommend continued follow-up of these findings with CT venography of the abdomen and pelvis.  Coag workup recommended including factor V Leiden prothrombin gene mutation PNH panel and platelet factor 4 for COVID related thromboses.  Other hypercoagulable workup deferred due to current coagulation which may affect results.  -11/5/2024 JAK2 negative.  Factor V Leiden heterozygous gene mutation.  Prothrombin gene mutation negative.  Heparin-induced platelet antibody -0.38.  Peripheral blood flow cytometry unremarkable.    -1/24/2025 [facility] consult: I reviewed the history in detail with the patient above.  He seems to have had cryptogenic rather massive portal vein  thrombosis with persistence on ultrasound in October.  He had not had not had COVID multiple times and has had abdominal pains for several years and wonders if this could have been going on for quite some time.  From a hypercoagulable standpoint he is heterozygous factor V Leiden mutated which is a weak procoagulant by itself and I suspect unlikely by itself to have caused all this but even if it were he would stay on lifetime DOAC.  He is currently takingXarelto and Plavix.  I would stick with that.  I will complete his hypercoagulable workup by checking PNH panel, SIFE and serum viscosity, and repeat the CT of his chest for comparison to make sure the small nodule in the right lower lobe is stable as he has already been scoped and had other malignancies vetted.  I will check his PSA.Will check his antiphospholipid antibody panel as well and it would be treated with Coumadin preferentially if found to be present and can cause significant and severe thromboses.  For completeness sake I will check his protein C and S panel jumped up as well as Antithrombin III but the odds of these being culprit are statistically quite low.  Regardless of what we find he of course needs lifetime anticoagulation but it is possible that with his portal vein with mesenteric insufficiency that he is getting wobbly edema of the bowel and DOAC absorption can fail in that setting.  He needs serial ultrasonography with vascular surgery and he wants to follow at  and I recommended he get back with them and if there is progressive clot then I would probably switch him to Coumadin unless we find malignancy.  I will see him back in about a month to go over all of this.    -1/24/2025 PSA normal 1.4.  Anticardiolipin IgG IgM/beta-2 glycoprotein IgG IgA IgM negative.  Lupus anticoagulant positive but on anticoagulation obfuscating results.  Antithrombin %  Protein C antigen 90%.  Protein C activity 105%  Protein S antigen total 84% free  95%.  Protein S functional 62% lower limit of normal 63%  SIFE plus PE showed no monoclonality and normal quantitative immunoglobulins.  Normal serum viscosity.  PNH panel negative    -3/7/2025 [facility]ology follow-up: Save for his heterozygous factor V Leiden mutation, the only other finding on his hypercoagulable workup was a positive lupus anticoagulant that I suspect is due to him being on the Xarelto and is falsely positive, I do not think any of the above is likely contributory to his clot.  To have antiphospholipid antibody syndrome I would expect him to have more than just the lupus anticoagulant and I would not put him on lifetime Coumadin on that basis alone.  Certainly if on repeat it is negative I would not call this antiphospholipid antibody syndrome with normal anticardiolipin and beta-2 glycoprotein testing.  I will check CT of his chest as mentioned last time and we will make referral to  vascular surgery for follow-up as per the recommendation of [facility].  As others have stated, though he could have had chronic COVID and COVID-related thrombosis, I would be hesitant to lay this all off on COVID and stop his anticoagulation finding out the hard way that there was more going on cryptic lead to cause this clot and have his thrombosis worsen which would threaten his hepatic/portal function further.  Hence I would recommend lifetime DOAC and he continues Plavix as well per prior recommendation of vascular medicine at [facility] which is fine.  I will also repeat his CBC in light of his fatigue    - 7/14/2025 [facility] hematology follow-up:.  7/8/2025 CT chest with contrast compared to8/2/2024 at  CT abdomen pelvis shows stable 9 mm left lower lobe nodule and 6 mm right upper lobe nodule along the minor fissure previously not included on the CT abdomen pelvis with hepatic steatosis with splenomegaly and multiple right upper quadrant collaterals related to known history of portal vein  occlusion.  For multiple nodules being solid and greater than 8 mm, follow-up recommended in 3-6 months then for low risk patient consider 18-24 months and in high risk patients if stable go to 3-6 months again, then 18-24-month.  Saw gastroenterology at ..  Has no known cirrhosis.  They plan repeat colonoscopy September 2025 with EGD at that time holding anticoagulation prior to scope to look for esophageal varices.  He does know he has sleep apnea and has not been wearing/tolerating his device and feels exhausted and has occasional palpitations when he awakens.  For his lung nodules and sleep apnea guidance I am sending him to my pulmonary physician colleagues.  In the meantime I will for completeness sake check MILDRED R/reticulin/MPL but I am doubtful of PRV.  It is important however to know this as PRV could be pathogenic in his portal vein thrombosis but his prior JAK2 was negative.  That is the bulk of myeloproliferative disorders.Also reinforced to him the need to get back to his vascular surgeons for follow-up at .     Portal vein thrombosis   1/24/2025 Initial Diagnosis    Portal vein thrombosis         HISTORY OF PRESENT ILLNESS:  The patient is a 70 y.o. male, here for follow up on management of portal vein occlusion.  Tolerating Eliquis and Plavix    Past Medical History:   Diagnosis Date   • Hypertension      Past Surgical History:   Procedure Laterality Date   • CARDIAC CATHETERIZATION     • CARDIAC CATHETERIZATION N/A 10/29/2019    Procedure: Left Heart Cath;  Surgeon: Kavon Fung MD;  Location: [facility] CATH INVASIVE LOCATION;  Service: Cardiology   • CORONARY ANGIOPLASTY WITH STENT PLACEMENT         Allergies   Allergen Reactions   • Statins Myalgia       Family History and Social History reviewed and changed as necessary    REVIEW OF SYSTEM:   Excessive daytime somnolence and fatigue    PHYSICAL EXAM:  No jaundice icterus or pallor.  No respiratory distress.    Vitals:    07/14/25 0831  "  BP: 155/79   Pulse: 69   Resp: 18   Temp: 97.8 °F (36.6 °C)   SpO2: 96%   Weight: 89.8 kg (198 lb)   Height: 177.8 cm (70\")     Vitals:    07/14/25 0831   PainSc: 0-No pain          ECOG score: 0           Vitals reviewed.    ECOG: (0) Fully Active - Able to Carry On All Pre-disease Performance Without Restriction    Lab Results   Component Value Date    HGB 18.9 (H) 07/14/2025    HCT 55.0 (H) 07/14/2025    MCV 79.9 07/14/2025    PLT 78 (L) 07/14/2025    WBC 4.36 07/14/2025    NEUTROABS 2.43 07/14/2025    LYMPHSABS 1.40 07/14/2025    MONOSABS 0.39 07/14/2025    EOSABS 0.10 07/14/2025    BASOSABS 0.03 07/14/2025       Lab Results   Component Value Date    GLUCOSE 103 (H) 10/29/2019    BUN 13 10/29/2019    CREATININE 1.00 10/29/2019     10/29/2019    K 4.9 10/29/2019     10/29/2019    CO2 31.0 (H) 10/29/2019    CALCIUM 9.5 10/29/2019    PROTEINTOT 7.4 01/24/2025    ALBUMIN 4.0 01/24/2025             ASSESSMENT & PLAN:  1.  Portal vein occlusion seen at .  History as below.  Heterozygous factor V Leiden mutation.  2.  Putative DVT lower extremity April 2024 per patient that was briefly heparinized and then the next day was told this was not the case and anticoagulation was not given further.  This was negative.  3.  Coronary artery disease  4.  COVID and flu  5.  History of Lyme disease  6.  History of colon polyps    - 8/17/2024 gastroenterology consultation Dr. Hairston  with past history of coronary disease stenting 2010 with hypertension hyperlipidemia diabetes with recent COVID and flu with portal vein thrombosis given thrombolytic and April 2024 with DVT left lower extremity.  Prescription for anticoagulation given but apparently never made it to the pharmacy and apparently was not taken.  8/1/2024 CT outside hospital concerning for portal vein thrombosis.  Doppler of lower extremity showed no DVTs.  CT venogram showed completely occluded main portal vein as well as right and left main portal " veins and partially occluded splenic vein, superior mesenteric and inferior mesenteric veins with mesenteric stranding in the mesenteric root with increased pelvic ascites and no evidence of inferior vena cava compression.  9 mm right lower lobe nodule.  Reportedly had seen hematology and was told he had no malignancies though they do not have those records.  Both parents had colon cancer in their 50s and 60s and gets colonoscopies every 3 years last 1 in October 2022 with a centimeter polyp.  History of Lyme disease as well.  Recommended lifelong anticoagulation and follow-up with hematology in October for hypercoagulable workup.  -10/29/2024 gastroenterology consult Dr. Jennifer Xiong [facility].  Note indicates patient was found to have portal vein thrombosis [facility] August 2024 started on Xarelto and Plavix and feeling since that time.  Had COVID infection July 2024 then flew August 2024.  No known trauma, liver disease, or coagulation disorder.  Normal liver morphology on outside imaging.  Recommended LV US for surveillance of portal vein thrombosis and liver morphology and continue Xarelto and Plavix with consultation of vascular medicine to evaluate for potential underlying coagulation disorder  -10/31/2024 vascular consult Dr. Chuck Lema at [facility] indicates patient had completely occluded main portal vein as well as right and left main portal veins August 2024.  Had partially occluded splenic vein, superior mesenteric vein, inferior mesenteric vein.  Recommend continued follow-up of these findings with CT venography of the abdomen and pelvis.  Coag workup recommended including factor V Leiden prothrombin gene mutation PNH panel and platelet factor 4 for COVID related thromboses.  Other hypercoagulable workup deferred due to current coagulation which may affect results.  -11/5/2024 JAK2 negative.  Factor V Leiden heterozygous gene mutation.  Prothrombin gene mutation negative.   Heparin-induced platelet antibody -0.38.  Peripheral blood flow cytometry unremarkable.    -1/24/2025 [facility] hematology consult: I reviewed the history in detail with the patient above.  He seems to have had cryptogenic rather massive portal vein thrombosis with persistence on ultrasound in October.  He had not had not had COVID multiple times and has had abdominal pains for several years and wonders if this could have been going on for quite some time.  From a hypercoagulable standpoint he is heterozygous factor V Leiden mutated which is a weak procoagulant by itself and I suspect unlikely by itself to have caused all this but even if it were he would stay on lifetime DOAC.  He is currently takingXarelto and Plavix.  I would stick with that.  I will complete his hypercoagulable workup by checking PNH panel, SIFE and serum viscosity, and repeat the CT of his chest for comparison to make sure the small nodule in the right lower lobe is stable as he has already been scoped and had other malignancies vetted.  I will check his PSA.Will check his antiphospholipid antibody panel as well and it would be treated with Coumadin preferentially if found to be present and can cause significant and severe thromboses.  For completeness sake I will check his protein C and S panel jumped up as well as Antithrombin III but the odds of these being culprit are statistically quite low.  Regardless of what we find he of course needs lifetime anticoagulation but it is possible that with his portal vein with mesenteric insufficiency that he is getting wobbly edema of the bowel and DOAC absorption can fail in that setting.  He needs serial ultrasonography with vascular surgery and he wants to follow at  and I recommended he get back with them and if there is progressive clot then I would probably switch him to Coumadin unless we find malignancy.  I will see him back in about a month to go over all of this.    -1/24/2025 PSA normal  1.4.  Anticardiolipin IgG IgM/beta-2 glycoprotein IgG IgA IgM negative.  Lupus anticoagulant positive but on anticoagulation obfuscating results.  Antithrombin %  Protein C antigen 90%.  Protein C activity 105%  Protein S antigen total 84% free 95%.  Protein S functional 62% lower limit of normal 63%  SIFE plus PE showed no monoclonality and normal quantitative immunoglobulins.  Normal serum viscosity.  PNH panel negative    -3/7/2025 [facility] hematology follow-up: Save for his heterozygous factor V Leiden mutation, the only other finding on his hypercoagulable workup was a positive lupus anticoagulant that I suspect is due to him being on the Xarelto and is falsely positive, I do not think any of the above is likely contributory to his clot.  To have antiphospholipid antibody syndrome I would expect him to have more than just the lupus anticoagulant and I would not put him on lifetime Coumadin on that basis alone.  Certainly if on repeat it is negative I would not call this antiphospholipid antibody syndrome with normal anticardiolipin and beta-2 glycoprotein testing.  I will check CT of his chest as mentioned last time and we will make referral to  vascular surgery for follow-up as per the recommendation of [facility].  As others have stated, though he could have had chronic COVID and COVID-related thrombosis, I would be hesitant to lay this all off on COVID and stop his anticoagulation finding out the hard way that there was more going on cryptic lead to cause this clot and have his thrombosis worsen which would threaten his hepatic/portal function further.  Hence I would recommend lifetime DOAC and he continues Plavix as well per prior recommendation of vascular medicine at [facility] which is fine.  I will also repeat his CBC in light of his fatigue    - 7/14/2025 [facility] hematology follow-up:.  7/8/2025 CT chest with contrast compared to8/2/2024 at  CT abdomen pelvis shows stable 9 mm  left lower lobe nodule and 6 mm right upper lobe nodule along the minor fissure previously not included on the CT abdomen pelvis with hepatic steatosis with splenomegaly and multiple right upper quadrant collaterals related to known history of portal vein occlusion.  For multiple nodules being solid and greater than 8 mm, follow-up recommended in 3-6 months then for low risk patient consider 18-24 months and in high risk patients if stable go to 3-6 months again, then 18-24-month.  Saw gastroenterology at ..  Has no known cirrhosis.  They plan repeat colonoscopy September 2025 with EGD at that time holding anticoagulation prior to scope to look for esophageal varices.  He does know he has sleep apnea and has not been wearing/tolerating his device and feels exhausted and has occasional palpitations when he awakens.  For his lung nodules and sleep apnea guidance I am sending him to my pulmonary physician colleagues.  In the meantime I will for completeness sake check MILDRED R/reticulin/MPL but I am doubtful of PRV.  It is important however to know this as PRV could be pathogenic in his portal vein thrombosis but his prior JAK2 was negative.  That is the bulk of myeloproliferative disorders.  Also reinforced to him the need to get back to his vascular surgeons for follow-up at .    Total time of care today inclusive of time spent today prior to patient's arrival reviewing interval data from cardiology and gastroenterology and during visit translating the above to the patient putting forth complex plan as outlined in after visit putting forth this plan took 50 minutes patient care time throughout the day today.        Ciro Stone MD    07/14/2025

## 2025-07-14 NOTE — PROGRESS NOTES
CHIEF COMPLAINT: Excessive daytime somnolence    Problem List:  Oncology/Hematology History Overview Note   1.  Portal vein occlusion seen at .  History as below.  Heterozygous factor V Leiden mutation.  2.  Putative DVT lower extremity April 2024 per patient that was briefly heparinized and then the next day was told this was not the case and anticoagulation was not given further.  This was negative.  3.  Coronary artery disease  4.  COVID and flu  5.  History of Lyme disease  6.  History of colon polyps    - 8/17/2024 gastroenterology consultation Dr. Hairston  with past history of coronary disease stenting 2010 with hypertension hyperlipidemia diabetes with recent COVID and flu with portal vein thrombosis given thrombolytic and April 2024 with DVT left lower extremity.  Prescription for anticoagulation given but apparently never made it to the pharmacy and apparently was not taken.  8/1/2024 CT outside hospital concerning for portal vein thrombosis.  Doppler of lower extremity showed no DVTs.  CT venogram showed completely occluded main portal vein as well as right and left main portal veins and partially occluded splenic vein, superior mesenteric and inferior mesenteric veins with mesenteric stranding in the mesenteric root with increased pelvic ascites and no evidence of inferior vena cava compression.  9 mm right lower lobe nodule.  Reportedly had seen hematology and was told he had no malignancies though they do not have those records.  Both parents had colon cancer in their 50s and 60s and gets colonoscopies every 3 years last 1 in October 2022 with a centimeter polyp.  History of Lyme disease as well.  Recommended lifelong anticoagulation and follow-up with hematology in October for hypercoagulable workup.  -10/29/2024 gastroenterology consult Dr. Jennifer Xiong [facility].  Note indicates patient was found to have portal vein thrombosis [facility] August 2024 started on Xarelto and  Plavix and feeling since that time.  Had COVID infection July 2024 then flew August 2024.  No known trauma, liver disease, or coagulation disorder.  Normal liver morphology on outside imaging.  Recommended LV US for surveillance of portal vein thrombosis and liver morphology and continue Xarelto and Plavix with consultation of vascular medicine to evaluate for potential underlying coagulation disorder  -10/31/2024 vascular consult Dr. Chuck Lema at [facility] indicates patient had completely occluded main portal vein as well as right and left main portal veins August 2024.  Had partially occluded splenic vein, superior mesenteric vein, inferior mesenteric vein.  Recommend continued follow-up of these findings with CT venography of the abdomen and pelvis.  Coag workup recommended including factor V Leiden prothrombin gene mutation PNH panel and platelet factor 4 for COVID related thromboses.  Other hypercoagulable workup deferred due to current coagulation which may affect results.  -11/5/2024 JAK2 negative.  Factor V Leiden heterozygous gene mutation.  Prothrombin gene mutation negative.  Heparin-induced platelet antibody -0.38.  Peripheral blood flow cytometry unremarkable.    -1/24/2025 [facility] consult: I reviewed the history in detail with the patient above.  He seems to have had cryptogenic rather massive portal vein thrombosis with persistence on ultrasound in October.  He had not had not had COVID multiple times and has had abdominal pains for several years and wonders if this could have been going on for quite some time.  From a hypercoagulable standpoint he is heterozygous factor V Leiden mutated which is a weak procoagulant by itself and I suspect unlikely by itself to have caused all this but even if it were he would stay on lifetime DOAC.  He is currently takingXarelto and Plavix.  I would stick with that.  I will complete his hypercoagulable workup by checking PNH panel, SIFE and  serum viscosity, and repeat the CT of his chest for comparison to make sure the small nodule in the right lower lobe is stable as he has already been scoped and had other malignancies vetted.  I will check his PSA.Will check his antiphospholipid antibody panel as well and it would be treated with Coumadin preferentially if found to be present and can cause significant and severe thromboses.  For completeness sake I will check his protein C and S panel jumped up as well as Antithrombin III but the odds of these being culprit are statistically quite low.  Regardless of what we find he of course needs lifetime anticoagulation but it is possible that with his portal vein with mesenteric insufficiency that he is getting wobbly edema of the bowel and DOAC absorption can fail in that setting.  He needs serial ultrasonography with vascular surgery and he wants to follow at  and I recommended he get back with them and if there is progressive clot then I would probably switch him to Coumadin unless we find malignancy.  I will see him back in about a month to go over all of this.    -1/24/2025 PSA normal 1.4.  Anticardiolipin IgG IgM/beta-2 glycoprotein IgG IgA IgM negative.  Lupus anticoagulant positive but on anticoagulation obfuscating results.  Antithrombin %  Protein C antigen 90%.  Protein C activity 105%  Protein S antigen total 84% free 95%.  Protein S functional 62% lower limit of normal 63%  SIFE plus PE showed no monoclonality and normal quantitative immunoglobulins.  Normal serum viscosity.  PNH panel negative    -3/7/2025 [facility]ology follow-up: Save for his heterozygous factor V Leiden mutation, the only other finding on his hypercoagulable workup was a positive lupus anticoagulant that I suspect is due to him being on the Xarelto and is falsely positive, I do not think any of the above is likely contributory to his clot.  To have antiphospholipid antibody syndrome I would expect him to have more  than just the lupus anticoagulant and I would not put him on lifetime Coumadin on that basis alone.  Certainly if on repeat it is negative I would not call this antiphospholipid antibody syndrome with normal anticardiolipin and beta-2 glycoprotein testing.  I will check CT of his chest as mentioned last time and we will make referral to  vascular surgery for follow-up as per the recommendation of [facility].  As others have stated, though he could have had chronic COVID and COVID-related thrombosis, I would be hesitant to lay this all off on COVID and stop his anticoagulation finding out the hard way that there was more going on cryptic lead to cause this clot and have his thrombosis worsen which would threaten his hepatic/portal function further.  Hence I would recommend lifetime DOAC and he continues Plavix as well per prior recommendation of vascular medicine at [facility] which is fine.  I will also repeat his CBC in light of his fatigue    - 7/14/2025 [facility] follow-up:.  7/8/2025 CT chest with contrast compared to8/2/2024 at  CT abdomen pelvis shows stable 9 mm left lower lobe nodule and 6 mm right upper lobe nodule along the minor fissure previously not included on the CT abdomen pelvis with hepatic steatosis with splenomegaly and multiple right upper quadrant collaterals related to known history of portal vein occlusion.  For multiple nodules being solid and greater than 8 mm, follow-up recommended in 3-6 months then for low risk patient consider 18-24 months and in high risk patients if stable go to 3-6 months again, then 18-24-month.  Saw gastroenterology at ..  Has no known cirrhosis.  They plan repeat colonoscopy September 2025 with EGD at that time holding anticoagulation prior to scope to look for esophageal varices.  He does know he has sleep apnea and has not been wearing/tolerating his device and feels exhausted and has occasional palpitations when he awakens.  For his  "lung nodules and sleep apnea guidance I am sending him to my pulmonary physician colleagues.  In the meantime I will for completeness sake check MILDRED R/reticulin/MPL but I am doubtful of PRV.  It is important however to know this as PRV could be pathogenic in his portal vein thrombosis but his prior JAK2 was negative.  That is the bulk of myeloproliferative disorders.Also reinforced to him the need to get back to his vascular surgeons for follow-up at .     Portal vein thrombosis   1/24/2025 Initial Diagnosis    Portal vein thrombosis         HISTORY OF PRESENT ILLNESS:  The patient is a 70 y.o. male, here for follow up on management of portal vein occlusion.  Tolerating Eliquis and Plavix    Past Medical History:   Diagnosis Date    Hypertension      Past Surgical History:   Procedure Laterality Date    CARDIAC CATHETERIZATION      CARDIAC CATHETERIZATION N/A 10/29/2019    Procedure: Left Heart Cath;  Surgeon: Kavon Fung MD;  Location: On license of [facility] CATH INVASIVE LOCATION;  Service: Cardiology    CORONARY ANGIOPLASTY WITH STENT PLACEMENT         Allergies   Allergen Reactions    Statins Myalgia       Family History and Social History reviewed and changed as necessary    REVIEW OF SYSTEM:   Excessive daytime somnolence and fatigue    PHYSICAL EXAM:  No jaundice icterus or pallor.  No respiratory distress.    Vitals:    07/14/25 0831   BP: 155/79   Pulse: 69   Resp: 18   Temp: 97.8 °F (36.6 °C)   SpO2: 96%   Weight: 89.8 kg (198 lb)   Height: 177.8 cm (70\")     Vitals:    07/14/25 0831   PainSc: 0-No pain          ECOG score: 0           Vitals reviewed.    ECOG: (0) Fully Active - Able to Carry On All Pre-disease Performance Without Restriction    Lab Results   Component Value Date    HGB 18.9 (H) 07/14/2025    HCT 55.0 (H) 07/14/2025    MCV 79.9 07/14/2025    PLT 78 (L) 07/14/2025    WBC 4.36 07/14/2025    NEUTROABS 2.43 07/14/2025    LYMPHSABS 1.40 07/14/2025    MONOSABS 0.39 07/14/2025    EOSABS 0.10 " 07/14/2025    BASOSABS 0.03 07/14/2025       Lab Results   Component Value Date    GLUCOSE 103 (H) 10/29/2019    BUN 13 10/29/2019    CREATININE 1.00 10/29/2019     10/29/2019    K 4.9 10/29/2019     10/29/2019    CO2 31.0 (H) 10/29/2019    CALCIUM 9.5 10/29/2019    PROTEINTOT 7.4 01/24/2025    ALBUMIN 4.0 01/24/2025             ASSESSMENT & PLAN:  1.  Portal vein occlusion seen at .  History as below.  Heterozygous factor V Leiden mutation.  2.  Putative DVT lower extremity April 2024 per patient that was briefly heparinized and then the next day was told this was not the case and anticoagulation was not given further.  This was negative.  3.  Coronary artery disease  4.  COVID and flu  5.  History of Lyme disease  6.  History of colon polyps    - 8/17/2024 gastroenterology consultation Dr. Hairston  with past history of coronary disease stenting 2010 with hypertension hyperlipidemia diabetes with recent COVID and flu with portal vein thrombosis given thrombolytic and April 2024 with DVT left lower extremity.  Prescription for anticoagulation given but apparently never made it to the pharmacy and apparently was not taken.  8/1/2024 CT outside hospital concerning for portal vein thrombosis.  Doppler of lower extremity showed no DVTs.  CT venogram showed completely occluded main portal vein as well as right and left main portal veins and partially occluded splenic vein, superior mesenteric and inferior mesenteric veins with mesenteric stranding in the mesenteric root with increased pelvic ascites and no evidence of inferior vena cava compression.  9 mm right lower lobe nodule.  Reportedly had seen hematology and was told he had no malignancies though they do not have those records.  Both parents had colon cancer in their 50s and 60s and gets colonoscopies every 3 years last 1 in October 2022 with a centimeter polyp.  History of Lyme disease as well.  Recommended lifelong anticoagulation and follow-up  with hematology in October for hypercoagulable workup.  -10/29/2024 gastroenterology consult Dr. Jennifer Xiong [facility].  Note indicates patient was found to have portal vein thrombosis [facility] August 2024 started on Xarelto and Plavix and feeling since that time.  Had COVID infection July 2024 then flew August 2024.  No known trauma, liver disease, or coagulation disorder.  Normal liver morphology on outside imaging.  Recommended  US for surveillance of portal vein thrombosis and liver morphology and continue Xarelto and Plavix with consultation of vascular medicine to evaluate for potential underlying coagulation disorder  -10/31/2024 vascular consult Dr. Chuck Lema at [facility] indicates patient had completely occluded main portal vein as well as right and left main portal veins August 2024.  Had partially occluded splenic vein, superior mesenteric vein, inferior mesenteric vein.  Recommend continued follow-up of these findings with CT venography of the abdomen and pelvis.  Coag workup recommended including factor V Leiden prothrombin gene mutation PNH panel and platelet factor 4 for COVID related thromboses.  Other hypercoagulable workup deferred due to current coagulation which may affect results.  -11/5/2024 JAK2 negative.  Factor V Leiden heterozygous gene mutation.  Prothrombin gene mutation negative.  Heparin-induced platelet antibody -0.38.  Peripheral blood flow cytometry unremarkable.    -1/24/2025 [facility] hematology consult: I reviewed the history in detail with the patient above.  He seems to have had cryptogenic rather massive portal vein thrombosis with persistence on ultrasound in October.  He had not had not had COVID multiple times and has had abdominal pains for several years and wonders if this could have been going on for quite some time.  From a hypercoagulable standpoint he is heterozygous factor V Leiden mutated which is a weak procoagulant by itself and I  suspect unlikely by itself to have caused all this but even if it were he would stay on lifetime DOAC.  He is currently takingXarelto and Plavix.  I would stick with that.  I will complete his hypercoagulable workup by checking PNH panel, SIFE and serum viscosity, and repeat the CT of his chest for comparison to make sure the small nodule in the right lower lobe is stable as he has already been scoped and had other malignancies vetted.  I will check his PSA.Will check his antiphospholipid antibody panel as well and it would be treated with Coumadin preferentially if found to be present and can cause significant and severe thromboses.  For completeness sake I will check his protein C and S panel jumped up as well as Antithrombin III but the odds of these being culprit are statistically quite low.  Regardless of what we find he of course needs lifetime anticoagulation but it is possible that with his portal vein with mesenteric insufficiency that he is getting wobbly edema of the bowel and DOAC absorption can fail in that setting.  He needs serial ultrasonography with vascular surgery and he wants to follow at  and I recommended he get back with them and if there is progressive clot then I would probably switch him to Coumadin unless we find malignancy.  I will see him back in about a month to go over all of this.    -1/24/2025 PSA normal 1.4.  Anticardiolipin IgG IgM/beta-2 glycoprotein IgG IgA IgM negative.  Lupus anticoagulant positive but on anticoagulation obfuscating results.  Antithrombin %  Protein C antigen 90%.  Protein C activity 105%  Protein S antigen total 84% free 95%.  Protein S functional 62% lower limit of normal 63%  SIFE plus PE showed no monoclonality and normal quantitative immunoglobulins.  Normal serum viscosity.  PNH panel negative    -3/7/2025 [facility] hematology follow-up: Save for his heterozygous factor V Leiden mutation, the only other finding on his hypercoagulable workup was a  positive lupus anticoagulant that I suspect is due to him being on the Xarelto and is falsely positive, I do not think any of the above is likely contributory to his clot.  To have antiphospholipid antibody syndrome I would expect him to have more than just the lupus anticoagulant and I would not put him on lifetime Coumadin on that basis alone.  Certainly if on repeat it is negative I would not call this antiphospholipid antibody syndrome with normal anticardiolipin and beta-2 glycoprotein testing.  I will check CT of his chest as mentioned last time and we will make referral to  vascular surgery for follow-up as per the recommendation of [facility].  As others have stated, though he could have had chronic COVID and COVID-related thrombosis, I would be hesitant to lay this all off on COVID and stop his anticoagulation finding out the hard way that there was more going on cryptic lead to cause this clot and have his thrombosis worsen which would threaten his hepatic/portal function further.  Hence I would recommend lifetime DOAC and he continues Plavix as well per prior recommendation of vascular medicine at [facility] which is fine.  I will also repeat his CBC in light of his fatigue    - 7/14/2025 [facility]ology follow-up:.  7/8/2025 CT chest with contrast compared to8/2/2024 at  CT abdomen pelvis shows stable 9 mm left lower lobe nodule and 6 mm right upper lobe nodule along the minor fissure previously not included on the CT abdomen pelvis with hepatic steatosis with splenomegaly and multiple right upper quadrant collaterals related to known history of portal vein occlusion.  For multiple nodules being solid and greater than 8 mm, follow-up recommended in 3-6 months then for low risk patient consider 18-24 months and in high risk patients if stable go to 3-6 months again, then 18-24-month.  Saw gastroenterology at ..  Has no known cirrhosis.  They plan repeat colonoscopy September 2025 with  EGD at that time holding anticoagulation prior to scope to look for esophageal varices.  He does know he has sleep apnea and has not been wearing/tolerating his device and feels exhausted and has occasional palpitations when he awakens.  For his lung nodules and sleep apnea guidance I am sending him to my pulmonary physician colleagues.  In the meantime I will for completeness sake check MILDRED R/reticulin/MPL but I am doubtful of PRV.  It is important however to know this as PRV could be pathogenic in his portal vein thrombosis but his prior JAK2 was negative.  That is the bulk of myeloproliferative disorders.  Also reinforced to him the need to get back to his vascular surgeons for follow-up at .    Total time of care today inclusive of time spent today prior to patient's arrival reviewing interval data from cardiology and gastroenterology and during visit translating the above to the patient putting forth complex plan as outlined in after visit putting forth this plan took 50 minutes patient care time throughout the day today.        Ciro Stone MD    07/14/2025

## 2025-07-16 LAB
APTT SCREEN TO CONFIRM RATIO: 1.23 RATIO (ref 0–1.34)
CALR MUTATION ANALYSIS RESULT: NORMAL
CONFIRM APTT/NORMAL: 50 SEC (ref 0–47.6)
DRVVT SCREEN TO CONFIRM RATIO: 1.4 RATIO (ref 0.8–1.2)
LA 2 SCREEN W REFLEX-IMP: ABNORMAL
MIXING DRVVT: 46.7 SEC (ref 0–40.4)
SCREEN APTT: 35.9 SEC (ref 0–43.5)
SCREEN DRVVT: 63.9 SEC (ref 0–47)
THROMBIN TIME: 21.9 SEC (ref 0–23)

## 2025-07-18 ENCOUNTER — PATIENT ROUNDING (BHMG ONLY) (OUTPATIENT)
Dept: PULMONOLOGY | Facility: CLINIC | Age: 70
End: 2025-07-18
Payer: MEDICARE

## 2025-07-18 ENCOUNTER — OFFICE VISIT (OUTPATIENT)
Dept: PULMONOLOGY | Facility: CLINIC | Age: 70
End: 2025-07-18
Payer: MEDICARE

## 2025-07-18 VITALS
SYSTOLIC BLOOD PRESSURE: 142 MMHG | OXYGEN SATURATION: 96 % | BODY MASS INDEX: 28.5 KG/M2 | TEMPERATURE: 97.6 F | HEART RATE: 98 BPM | WEIGHT: 199.1 LBS | HEIGHT: 70 IN | DIASTOLIC BLOOD PRESSURE: 70 MMHG

## 2025-07-18 DIAGNOSIS — G47.33 OSA (OBSTRUCTIVE SLEEP APNEA): ICD-10-CM

## 2025-07-18 DIAGNOSIS — R06.83 SNORING: ICD-10-CM

## 2025-07-18 DIAGNOSIS — G47.19 EXCESSIVE DAYTIME SLEEPINESS: ICD-10-CM

## 2025-07-18 DIAGNOSIS — R91.8 MULTIPLE PULMONARY NODULES: ICD-10-CM

## 2025-07-18 DIAGNOSIS — R94.39 ABNORMAL STRESS TEST: Primary | ICD-10-CM

## 2025-07-18 DIAGNOSIS — Z78.9 DIFFICULTY WITH CPAP USE: ICD-10-CM

## 2025-07-18 PROBLEM — E11.9 WELL CONTROLLED TYPE 2 DIABETES MELLITUS: Status: ACTIVE | Noted: 2023-05-04

## 2025-07-18 PROBLEM — I82.409 DVT (DEEP VENOUS THROMBOSIS): Status: ACTIVE | Noted: 2024-08-13

## 2025-07-18 PROBLEM — I10 ESSENTIAL HYPERTENSION: Status: ACTIVE | Noted: 2023-02-24

## 2025-07-18 PROBLEM — J32.9 CHRONIC RECURRENT SINUSITIS: Status: ACTIVE | Noted: 2023-05-04

## 2025-07-18 PROBLEM — B02.29 POSTHERPETIC NEURALGIA: Status: ACTIVE | Noted: 2024-08-13

## 2025-07-18 NOTE — PROGRESS NOTES
July 18, 2025    Hello, may I speak with Kunaltoni Perezdialberta Yusufon?    My name is PITER       I am  with [facility]  [address] [place] [id number] [phone].    Before we get started may I verify your date of birth? 1955    I am calling to officially welcome you to our practice and ask about your recent visit. Is this a good time to talk? YES    Tell me about your visit with us. What things went well?  PT STATED THAT HE HAD A GREAT VISIT WITH . HE STATED THAT HE LIKED HIM AND THOUGHT HE WAS A GOOD FIT FOR HIM AND EXPLAINED THINGS CLEARLY. HE STATED THAT THE PFT TEAM WAS GOOD BUT THE TEST WAS SLIGHT CLAUSTROPHOBIC. OVERALL, HE HAD A POSITIVE EXPERIENCE.        We're always looking for ways to make our patients' experiences even better. Do you have recommendations on ways we may improve?  no    Overall were you satisfied with your first visit to our practice? yes       I appreciate you taking the time to speak with me today. Is there anything else I can do for you? no      Thank you, and have a great day.

## 2025-07-18 NOTE — PROGRESS NOTES
Subjective:     Chief Complaint:   Chief Complaint   Patient presents with    Sleep Apnea       HPI:    Kunal Pierre is a 70 y.o. male seen in consultation at the request of Ciro Stone MD    He was originally diagnosed with obstructive sleep apnea he believes in 2020 in [place] at [facility].  He underwent a home sleep apnea test.  I do not have this available.  In any event he was placed on CPAP therapy.  He tolerated this poorly but persisted with it and tried multiple masks.  He was eventually seen at [facility] by CONRADO Flores and underwent a titration study at [facility].  This revealed that CPAP pressures from 8 through 15 cm H2O were less than fully effective and that BiPAP was attempted up through 22/18.    He has had trouble tolerating these pressures and had the same experience as he did with CPAP.  He basically has had difficulty using it and the high pressures tend to make the mask leak and come off his face.  Even if he does use it he feels no better in the morning.    At the current time he is very sleepy.  His [place] Sleepiness Scale is 17 today.  He continues to snore and he awakens himself up with his heart racing and a sensation of not being able to breathe.  He has also been noted to have polycythemia.    He saw Dr. Stone for portal vein thrombosis which was felt to be due to a combination of heterozygous factor V Leiden mutation and a COVID infection.    He also has incidentally found pulmonary nodules.  On his CT of the abdomen at  an incidental left lower lobe pulmonary nodule was noted in the 8-9 mm range.  A follow-up CAT scan done earlier this month revealed this nodule to be unchanged and there was a 6 mm right upper lobe nodule that was not included on previous scans so was of unknown chronicity.  He says he has been told he had pulmonary nodules in the remote past.    Current medications are:   Current Outpatient Medications:     amLODIPine (NORVASC)  10 MG tablet, Take 1 tablet by mouth Daily., Disp: , Rfl:     clopidogrel (PLAVIX) 75 MG tablet, Take 1 tablet by mouth Daily., Disp: , Rfl:     dilTIAZem (TIAZAC) 120 MG 24 hr capsule, Take 1 capsule by mouth Daily., Disp: , Rfl:     glipizide (GLUCOTROL XL) 10 MG 24 hr tablet, Take 1 tablet by mouth Daily., Disp: , Rfl:     Jardiance 10 MG tablet tablet, , Disp: , Rfl:     lisinopril (PRINIVIL,ZESTRIL) 40 MG tablet, Take 1 tablet by mouth Daily., Disp: , Rfl:     metoprolol succinate XL (TOPROL-XL) 25 MG 24 hr tablet, Take 1 tablet by mouth Daily., Disp: , Rfl:     vilazodone (VIIBRYD) 10 MG tablet tablet, , Disp: , Rfl:     Xarelto 20 MG tablet, , Disp: , Rfl: .      The patient's relevant past medical, surgical, family and social history were reviewed and updated in Epic as appropriate.     ROS:    Review of Systems  ROS documented in patient questionnaire ×14 systems.  Otherwise negative except as noted in HPI.    Objective:    Physical Exam  Vitals and nursing note reviewed.   Constitutional:       Appearance: Normal appearance. He is well-developed.   HENT:      Head: Normocephalic and atraumatic.      Nose: Nose normal.      Mouth/Throat:      Mouth: Mucous membranes are moist.      Pharynx: Oropharynx is clear. No oropharyngeal exudate.      Comments: Class IV airway  Upper denture plate  Eyes:      General: No scleral icterus.     Conjunctiva/sclera: Conjunctivae normal.   Neck:      Thyroid: No thyromegaly.      Trachea: No tracheal deviation.   Cardiovascular:      Rate and Rhythm: Normal rate and regular rhythm.      Heart sounds: No murmur heard.     No friction rub. No gallop.   Pulmonary:      Effort: Pulmonary effort is normal. No respiratory distress.      Breath sounds: No wheezing or rales.   Musculoskeletal:         General: No deformity.   Skin:     General: Skin is warm and dry.      Findings: No rash.   Neurological:      Mental Status: He is alert and oriented to person, place, and time.    Psychiatric:         Behavior: Behavior normal.         Thought Content: Thought content normal.         Data:     PFT: No obstruction.  Mild restriction.  Normal diffusion.  Poor reproducibility.    CT images and reports viewed    Assessment:    Problem List Items Addressed This Visit          Pulmonary Problems    GALO (obstructive sleep apnea)    Relevant Orders    Polysomnography 4 or More Parameters    Multiple pulmonary nodules    Overview   6 mm right upper lobe: Incidental finding July 2025  9 mm left lower lobe: Incidental finding August 2024         Relevant Orders    CT Chest Without Contrast Diagnostic       Other    Abnormal stress test - Primary    Overview   Added automatically from request for surgery [id number]         Relevant Orders    Spirometry with Diffusion Capacity & Lung Volumes (Completed)     Other Visit Diagnoses         Snoring        Relevant Orders    Polysomnography 4 or More Parameters      Excessive daytime sleepiness        Relevant Orders    Polysomnography 4 or More Parameters      Difficulty with CPAP use        Relevant Orders    Polysomnography 4 or More Parameters            70-year-old male referred by Dr. Stone.  He has 2 primary separate issues:    Obstructive sleep apnea: Unknown severity but he is very symptomatic with daytime somnolence and frequent nocturnal awakenings with difficulty breathing.  He is desperate for a solution.  He has been intolerant of CPAP and BiPAP and underwent a CPAP and BiPAP titration at [facility] in 2024 and has tried multiple masks.  He is interested in a non-PAP option  Multiple pulm nodules: 9 mm nodule in the left lower lobe stable compared to August of last year and a 6 mm nodule in the right upper lobe of unknown stability as this was not imaged on the abdominal CT last year.  He also has a calcified nodule on the right as well as calcified mediastinal adenopathy consistent with prior granulomatous disease    Plan:     I discussed  non-PAP options with him.  He is not a candidate for an oral appliance due to his upper dentures and he was interested in an inspire device.  I explained this with him in detail as well as the potential limitations and the fact that PAP therapy remains the gold standard.  He has read about this extensively and is interested in proceeding.  He will need a new sleep study and my preference is that this will be a PSG so we can get a more accurate interpretation of his actual AHI and evidence for any central events  Further recommendations after the above.  If he meets criteria or I will refer him to ENT for a further evaluation  In regards to his pulmonary nodules by standard Fleischner guidelines he needs a CAT scan in July 2026 which will be 12 months from now  Discussed all the above in detail with the patient and all questions answered.  He is agreeable to proceed as described.    Level of service justified based on 45 minutes spent in patient care on this date of service including, but not limited to: preparing to see the patient, obtaining and/or reviewing history, performing medically appropriate examination, ordering tests/medicine/procedures, independently interpreting results, documenting clinical information in EHR, and counseling/education of patient/family/caregiver.  This is exclusive of time spent on other separate services such as performing procedures or test interpretation, if applicable.  (Level 4 45-59 minutes; Level 5 60-74 minutes)    Signed by  Boogie Good MD

## 2025-07-21 LAB
MPL MUTATION RESULT: NORMAL
REF LAB TEST METHOD: NORMAL

## 2025-07-22 LAB
CCV RESULT: NORMAL
JAK2 EXONS 12-15 BY PCR RESULT: NORMAL

## 2025-08-11 ENCOUNTER — OFFICE VISIT (OUTPATIENT)
Dept: ONCOLOGY | Facility: CLINIC | Age: 70
End: 2025-08-11
Payer: MEDICARE

## 2025-08-11 VITALS
DIASTOLIC BLOOD PRESSURE: 75 MMHG | HEART RATE: 96 BPM | RESPIRATION RATE: 18 BRPM | BODY MASS INDEX: 28.06 KG/M2 | WEIGHT: 196 LBS | TEMPERATURE: 98.3 F | HEIGHT: 70 IN | OXYGEN SATURATION: 96 % | SYSTOLIC BLOOD PRESSURE: 170 MMHG

## 2025-08-11 DIAGNOSIS — I81 PORTAL VEIN THROMBOSIS: Primary | ICD-10-CM

## (undated) DEVICE — CATH DIAG EXPO M/ PK 5F FL4/FR4 PIG

## (undated) DEVICE — INTRO SHEATH PRELUDE IDEAL SPRNG COIL 021 6F 23X80CM

## (undated) DEVICE — GW INQWIRE FC PTFE STD J/1.5 .035 260

## (undated) DEVICE — KT MANIFOLD CATHLAB CUST

## (undated) DEVICE — PK CATH CARD 10

## (undated) DEVICE — DEV COMP RAD PRELUDESYNC 24CM

## (undated) DEVICE — CATH DIAG EXPO .045 FL3  5F 100CM